# Patient Record
Sex: FEMALE | Race: OTHER | NOT HISPANIC OR LATINO | URBAN - METROPOLITAN AREA
[De-identification: names, ages, dates, MRNs, and addresses within clinical notes are randomized per-mention and may not be internally consistent; named-entity substitution may affect disease eponyms.]

---

## 2024-11-22 ENCOUNTER — EMERGENCY (EMERGENCY)
Facility: HOSPITAL | Age: 29
LOS: 1 days | Discharge: HOME | End: 2024-11-22
Attending: PERSONAL EMERGENCY RESPONSE ATTENDANT | Admitting: PERSONAL EMERGENCY RESPONSE ATTENDANT
Payer: COMMERCIAL

## 2024-11-22 VITALS
HEART RATE: 60 BPM | DIASTOLIC BLOOD PRESSURE: 82 MMHG | OXYGEN SATURATION: 100 % | SYSTOLIC BLOOD PRESSURE: 138 MMHG | RESPIRATION RATE: 16 BRPM | WEIGHT: 132.94 LBS | HEIGHT: 64 IN | TEMPERATURE: 98 F

## 2024-11-22 LAB
ALBUMIN SERPL ELPH-MCNC: 4.5 G/DL — SIGNIFICANT CHANGE UP (ref 3.3–5)
ALP SERPL-CCNC: 44 U/L — SIGNIFICANT CHANGE UP (ref 40–120)
ALT FLD-CCNC: 8 U/L — SIGNIFICANT CHANGE UP (ref 4–33)
ANION GAP SERPL CALC-SCNC: 12 MMOL/L — SIGNIFICANT CHANGE UP (ref 7–14)
APPEARANCE UR: ABNORMAL
AST SERPL-CCNC: 13 U/L — SIGNIFICANT CHANGE UP (ref 4–32)
BACTERIA # UR AUTO: ABNORMAL /HPF
BASOPHILS # BLD AUTO: 0.05 K/UL — SIGNIFICANT CHANGE UP (ref 0–0.2)
BASOPHILS NFR BLD AUTO: 0.3 % — SIGNIFICANT CHANGE UP (ref 0–2)
BILIRUB SERPL-MCNC: 0.4 MG/DL — SIGNIFICANT CHANGE UP (ref 0.2–1.2)
BILIRUB UR-MCNC: NEGATIVE — SIGNIFICANT CHANGE UP
BLD GP AB SCN SERPL QL: NEGATIVE — SIGNIFICANT CHANGE UP
BUN SERPL-MCNC: 11 MG/DL — SIGNIFICANT CHANGE UP (ref 7–23)
CALCIUM SERPL-MCNC: 9.6 MG/DL — SIGNIFICANT CHANGE UP (ref 8.4–10.5)
CAST: 0 /LPF — SIGNIFICANT CHANGE UP (ref 0–4)
CHLORIDE SERPL-SCNC: 102 MMOL/L — SIGNIFICANT CHANGE UP (ref 98–107)
CO2 SERPL-SCNC: 25 MMOL/L — SIGNIFICANT CHANGE UP (ref 22–31)
COLOR SPEC: YELLOW — SIGNIFICANT CHANGE UP
CREAT SERPL-MCNC: 0.72 MG/DL — SIGNIFICANT CHANGE UP (ref 0.5–1.3)
DIFF PNL FLD: ABNORMAL
EGFR: 117 ML/MIN/1.73M2 — SIGNIFICANT CHANGE UP
EOSINOPHIL # BLD AUTO: 0.11 K/UL — SIGNIFICANT CHANGE UP (ref 0–0.5)
EOSINOPHIL NFR BLD AUTO: 0.6 % — SIGNIFICANT CHANGE UP (ref 0–6)
GLUCOSE SERPL-MCNC: 97 MG/DL — SIGNIFICANT CHANGE UP (ref 70–99)
GLUCOSE UR QL: NEGATIVE MG/DL — SIGNIFICANT CHANGE UP
HCG SERPL-ACNC: SIGNIFICANT CHANGE UP MIU/ML
HCT VFR BLD CALC: 40.9 % — SIGNIFICANT CHANGE UP (ref 34.5–45)
HGB BLD-MCNC: 13.5 G/DL — SIGNIFICANT CHANGE UP (ref 11.5–15.5)
IANC: 11.02 K/UL — HIGH (ref 1.8–7.4)
IMM GRANULOCYTES NFR BLD AUTO: 0.8 % — SIGNIFICANT CHANGE UP (ref 0–0.9)
KETONES UR-MCNC: 15 MG/DL
LEUKOCYTE ESTERASE UR-ACNC: NEGATIVE — SIGNIFICANT CHANGE UP
LYMPHOCYTES # BLD AUTO: 25.7 % — SIGNIFICANT CHANGE UP (ref 13–44)
LYMPHOCYTES # BLD AUTO: 4.39 K/UL — HIGH (ref 1–3.3)
MCHC RBC-ENTMCNC: 30.2 PG — SIGNIFICANT CHANGE UP (ref 27–34)
MCHC RBC-ENTMCNC: 33 G/DL — SIGNIFICANT CHANGE UP (ref 32–36)
MCV RBC AUTO: 91.5 FL — SIGNIFICANT CHANGE UP (ref 80–100)
MONOCYTES # BLD AUTO: 1.38 K/UL — HIGH (ref 0–0.9)
MONOCYTES NFR BLD AUTO: 8.1 % — SIGNIFICANT CHANGE UP (ref 2–14)
NEUTROPHILS # BLD AUTO: 11.02 K/UL — HIGH (ref 1.8–7.4)
NEUTROPHILS NFR BLD AUTO: 64.5 % — SIGNIFICANT CHANGE UP (ref 43–77)
NITRITE UR-MCNC: NEGATIVE — SIGNIFICANT CHANGE UP
NRBC # BLD: 0 /100 WBCS — SIGNIFICANT CHANGE UP (ref 0–0)
NRBC # FLD: 0 K/UL — SIGNIFICANT CHANGE UP (ref 0–0)
PH UR: 6 — SIGNIFICANT CHANGE UP (ref 5–8)
PLATELET # BLD AUTO: 262 K/UL — SIGNIFICANT CHANGE UP (ref 150–400)
POTASSIUM SERPL-MCNC: 4.5 MMOL/L — SIGNIFICANT CHANGE UP (ref 3.5–5.3)
POTASSIUM SERPL-SCNC: 4.5 MMOL/L — SIGNIFICANT CHANGE UP (ref 3.5–5.3)
PROT SERPL-MCNC: 7.4 G/DL — SIGNIFICANT CHANGE UP (ref 6–8.3)
PROT UR-MCNC: NEGATIVE MG/DL — SIGNIFICANT CHANGE UP
RBC # BLD: 4.47 M/UL — SIGNIFICANT CHANGE UP (ref 3.8–5.2)
RBC # FLD: 12.6 % — SIGNIFICANT CHANGE UP (ref 10.3–14.5)
RBC CASTS # UR COMP ASSIST: 2 /HPF — SIGNIFICANT CHANGE UP (ref 0–4)
REVIEW: SIGNIFICANT CHANGE UP
RH IG SCN BLD-IMP: POSITIVE — SIGNIFICANT CHANGE UP
SODIUM SERPL-SCNC: 139 MMOL/L — SIGNIFICANT CHANGE UP (ref 135–145)
SP GR SPEC: 1.02 — SIGNIFICANT CHANGE UP (ref 1–1.03)
SQUAMOUS # UR AUTO: 17 /HPF — HIGH (ref 0–5)
UROBILINOGEN FLD QL: 0.2 MG/DL — SIGNIFICANT CHANGE UP (ref 0.2–1)
WBC # BLD: 17.09 K/UL — HIGH (ref 3.8–10.5)
WBC # FLD AUTO: 17.09 K/UL — HIGH (ref 3.8–10.5)
WBC UR QL: 2 /HPF — SIGNIFICANT CHANGE UP (ref 0–5)

## 2024-11-22 PROCEDURE — 99284 EMERGENCY DEPT VISIT MOD MDM: CPT

## 2024-11-22 PROCEDURE — 76817 TRANSVAGINAL US OBSTETRIC: CPT | Mod: 26

## 2024-11-22 RX ORDER — SODIUM CHLORIDE 9 MG/ML
1000 INJECTION, SOLUTION INTRAMUSCULAR; INTRAVENOUS; SUBCUTANEOUS ONCE
Refills: 0 | Status: COMPLETED | OUTPATIENT
Start: 2024-11-22 | End: 2024-11-22

## 2024-11-22 RX ADMIN — SODIUM CHLORIDE 1000 MILLILITER(S): 9 INJECTION, SOLUTION INTRAMUSCULAR; INTRAVENOUS; SUBCUTANEOUS at 21:50

## 2024-11-22 NOTE — ED PROVIDER NOTE - CLINICAL SUMMARY MEDICAL DECISION MAKING FREE TEXT BOX
Attending MD Rollins.  Pt seen and managed by me in real time.  No sig suprapubic TTP.  Pt transported to US and in US at time of signout to incoming team pending results and pelvic exam.  Pt well appearing and hemodynamically stable at time of signout.

## 2024-11-22 NOTE — ED PROVIDER NOTE - OBJECTIVE STATEMENT
Attending MD Rollins.  Pt is a 28 yo  at ~6 wks gestation without preg complication with established IUP on sono on Monday at .  Pt presents to ED currently with complaint of mild suprapubic cramping that has increased over course of day with vaginal spotting assoc.  No fevers/chills.  Pt endorses nausea during this pregnancy that has improved with outpt reglan.

## 2024-11-22 NOTE — ED PROVIDER NOTE - PROGRESS NOTE DETAILS
[employer]:  received sign out on patient.  pt feeling better.  US shows IUP and subchorionic hemorrhage.  will discharge with gyn follow up.

## 2024-11-22 NOTE — ED PROVIDER NOTE - PATIENT PORTAL LINK FT
You can access the FollowMyHealth Patient Portal offered by [facility] by registering at the following website: [email or web address]. By joining [employer]’s FollowMyHealth portal, you will also be able to view your health information using other applications (apps) compatible with our system.

## 2024-11-22 NOTE — ED ADULT TRIAGE NOTE - CHIEF COMPLAINT QUOTE
Pt reports she is 6 wks pregnant,  c/o pink vaginal spotting. Denies any associated pelvic cramping, heavy bleeding, nausea, vomiting or fevers. Denies any PMHx.

## 2024-11-22 NOTE — ED PROVIDER NOTE - NSFOLLOWUPINSTRUCTIONS_ED_ALL_ED_FT
Follow up with your OB/GYN in 3-5 days.    Return to the ED for increased bleeding, feeling faint or other emergent concerns.

## 2024-11-22 NOTE — ED ADULT NURSE NOTE - OBJECTIVE STATEMENT
Pt received in intake 12. Pt alert and oriented x 4, ambulatory at baseline. Pt denies pertinent medical history. Pt c/o pink vaginal spotting, pt , 6 weeks pregnant. Pt endorses abdominal cramping, states it feels like her period. Pt endorses nausea, reglan with some relief. Respirations even and unlabored, NAD. Pt denies chest pain, shortness of breath, N/V/D, headache, dizziness, fever, chills. 20G IV in the right AC, labs drawn and fluids infusing per MD orders.

## 2024-11-23 VITALS
TEMPERATURE: 98 F | DIASTOLIC BLOOD PRESSURE: 77 MMHG | HEART RATE: 89 BPM | SYSTOLIC BLOOD PRESSURE: 111 MMHG | OXYGEN SATURATION: 100 % | RESPIRATION RATE: 16 BRPM

## 2024-11-23 RX ORDER — METOCLOPRAMIDE HCL 10 MG
10 TABLET ORAL ONCE
Refills: 0 | Status: COMPLETED | OUTPATIENT
Start: 2024-11-23 | End: 2024-11-23

## 2024-11-23 RX ADMIN — Medication 10 MILLIGRAM(S): at 02:22

## 2024-11-23 NOTE — ED ADULT NURSE REASSESSMENT NOTE - NS ED NURSE REASSESS COMMENT FT1
Pt received from US at this time to RW. Pt A&Ox3 sitting up in stretcher resting comfortably. Respirations even and unlabored on room air. No acute distress noted. Denies headache, dizziness, chest pain, SOB, nausea or vomiting at this time. VS as noted in flow sheet. Plan of care ongoing, comfort measures provided and safety measures maintained. Awaiting US results.

## 2024-11-23 NOTE — ED ADULT NURSE REASSESSMENT NOTE - NS ED NURSE REASSESS COMMENT FT1
Upon reassessment pt A&Ox3 sitting up in stretcher endorsing relief of nausea at this time. Respirations even and unlabored on room air. No acute distress noted. Denies headache, dizziness, chest pain and SOB at this time. VS as noted in flow sheet. Pt discharged by MD Wasserman at bedside. Pt ambulated out of ED with steady gait.

## 2024-11-24 LAB
CULTURE RESULTS: SIGNIFICANT CHANGE UP
SPECIMEN SOURCE: SIGNIFICANT CHANGE UP

## 2024-11-25 ENCOUNTER — NON-APPOINTMENT (OUTPATIENT)
Age: 29
End: 2024-11-25

## 2024-11-25 ENCOUNTER — APPOINTMENT (OUTPATIENT)
Dept: OBGYN | Facility: CLINIC | Age: 29
End: 2024-11-25
Payer: COMMERCIAL

## 2024-11-25 ENCOUNTER — ASOB RESULT (OUTPATIENT)
Age: 29
End: 2024-11-25

## 2024-11-25 VITALS
WEIGHT: 132 LBS | SYSTOLIC BLOOD PRESSURE: 112 MMHG | OXYGEN SATURATION: 98 % | HEIGHT: 64 IN | TEMPERATURE: 98.1 F | HEART RATE: 57 BPM | DIASTOLIC BLOOD PRESSURE: 72 MMHG | BODY MASS INDEX: 22.53 KG/M2 | RESPIRATION RATE: 16 BRPM

## 2024-11-25 DIAGNOSIS — O20.0 THREATENED ABORTION: ICD-10-CM

## 2024-11-25 DIAGNOSIS — Z01.419 ENCOUNTER FOR GYNECOLOGICAL EXAMINATION (GENERAL) (ROUTINE) W/OUT ABNORMAL FINDINGS: ICD-10-CM

## 2024-11-25 PROCEDURE — 76817 TRANSVAGINAL US OBSTETRIC: CPT

## 2024-11-25 PROCEDURE — 99385 PREV VISIT NEW AGE 18-39: CPT

## 2024-11-27 LAB
C TRACH RRNA SPEC QL NAA+PROBE: NOT DETECTED
N GONORRHOEA RRNA SPEC QL NAA+PROBE: NOT DETECTED
SOURCE TP AMPLIFICATION: NORMAL

## 2024-12-02 LAB — CYTOLOGY CVX/VAG DOC THIN PREP: ABNORMAL

## 2024-12-18 ENCOUNTER — APPOINTMENT (OUTPATIENT)
Dept: OBGYN | Facility: CLINIC | Age: 29
End: 2024-12-18

## 2024-12-18 ENCOUNTER — APPOINTMENT (OUTPATIENT)
Dept: OBGYN | Facility: CLINIC | Age: 29
End: 2024-12-18
Payer: COMMERCIAL

## 2024-12-18 ENCOUNTER — EMERGENCY (EMERGENCY)
Facility: HOSPITAL | Age: 29
LOS: 1 days | Discharge: HOME | End: 2024-12-18
Admitting: STUDENT IN AN ORGANIZED HEALTH CARE EDUCATION/TRAINING PROGRAM
Payer: COMMERCIAL

## 2024-12-18 ENCOUNTER — ASOB RESULT (OUTPATIENT)
Age: 29
End: 2024-12-18

## 2024-12-18 VITALS
TEMPERATURE: 98 F | SYSTOLIC BLOOD PRESSURE: 121 MMHG | HEIGHT: 64 IN | RESPIRATION RATE: 18 BRPM | DIASTOLIC BLOOD PRESSURE: 89 MMHG | OXYGEN SATURATION: 100 % | HEART RATE: 66 BPM | WEIGHT: 130.07 LBS

## 2024-12-18 VITALS
TEMPERATURE: 98 F | DIASTOLIC BLOOD PRESSURE: 58 MMHG | SYSTOLIC BLOOD PRESSURE: 120 MMHG | RESPIRATION RATE: 18 BRPM | OXYGEN SATURATION: 100 % | HEART RATE: 57 BPM

## 2024-12-18 LAB
ALBUMIN SERPL ELPH-MCNC: 3.9 G/DL — SIGNIFICANT CHANGE UP (ref 3.3–5)
ALP SERPL-CCNC: 49 U/L — SIGNIFICANT CHANGE UP (ref 40–120)
ALT FLD-CCNC: 12 U/L — SIGNIFICANT CHANGE UP (ref 4–33)
ANION GAP SERPL CALC-SCNC: 12 MMOL/L — SIGNIFICANT CHANGE UP (ref 7–14)
APTT BLD: 27.4 SEC — SIGNIFICANT CHANGE UP (ref 24.5–35.6)
AST SERPL-CCNC: 18 U/L — SIGNIFICANT CHANGE UP (ref 4–32)
BASOPHILS # BLD AUTO: 0.05 K/UL — SIGNIFICANT CHANGE UP (ref 0–0.2)
BASOPHILS NFR BLD AUTO: 0.3 % — SIGNIFICANT CHANGE UP (ref 0–2)
BILIRUB SERPL-MCNC: <0.2 MG/DL — SIGNIFICANT CHANGE UP (ref 0.2–1.2)
BUN SERPL-MCNC: 14 MG/DL — SIGNIFICANT CHANGE UP (ref 7–23)
CALCIUM SERPL-MCNC: 9.8 MG/DL — SIGNIFICANT CHANGE UP (ref 8.4–10.5)
CHLORIDE SERPL-SCNC: 100 MMOL/L — SIGNIFICANT CHANGE UP (ref 98–107)
CO2 SERPL-SCNC: 24 MMOL/L — SIGNIFICANT CHANGE UP (ref 22–31)
CREAT SERPL-MCNC: 0.67 MG/DL — SIGNIFICANT CHANGE UP (ref 0.5–1.3)
EGFR: 123 ML/MIN/1.73M2 — SIGNIFICANT CHANGE UP
EGFR: 123 ML/MIN/1.73M2 — SIGNIFICANT CHANGE UP
EOSINOPHIL # BLD AUTO: 0.13 K/UL — SIGNIFICANT CHANGE UP (ref 0–0.5)
EOSINOPHIL NFR BLD AUTO: 0.9 % — SIGNIFICANT CHANGE UP (ref 0–6)
GLUCOSE SERPL-MCNC: 91 MG/DL — SIGNIFICANT CHANGE UP (ref 70–99)
HCG SERPL-ACNC: SIGNIFICANT CHANGE UP MIU/ML
HCT VFR BLD CALC: 38.3 % — SIGNIFICANT CHANGE UP (ref 34.5–45)
HGB BLD-MCNC: 13 G/DL — SIGNIFICANT CHANGE UP (ref 11.5–15.5)
IANC: 9.74 K/UL — HIGH (ref 1.8–7.4)
IMM GRANULOCYTES NFR BLD AUTO: 1.5 % — HIGH (ref 0–0.9)
INR BLD: 0.91 RATIO — SIGNIFICANT CHANGE UP (ref 0.85–1.16)
LYMPHOCYTES # BLD AUTO: 22.5 % — SIGNIFICANT CHANGE UP (ref 13–44)
LYMPHOCYTES # BLD AUTO: 3.35 K/UL — HIGH (ref 1–3.3)
MAGNESIUM SERPL-MCNC: 2 MG/DL — SIGNIFICANT CHANGE UP (ref 1.6–2.6)
MCHC RBC-ENTMCNC: 30.7 PG — SIGNIFICANT CHANGE UP (ref 27–34)
MCHC RBC-ENTMCNC: 33.9 G/DL — SIGNIFICANT CHANGE UP (ref 32–36)
MCV RBC AUTO: 90.3 FL — SIGNIFICANT CHANGE UP (ref 80–100)
MONOCYTES # BLD AUTO: 1.37 K/UL — HIGH (ref 0–0.9)
MONOCYTES NFR BLD AUTO: 9.2 % — SIGNIFICANT CHANGE UP (ref 2–14)
NEUTROPHILS # BLD AUTO: 9.74 K/UL — HIGH (ref 1.8–7.4)
NEUTROPHILS NFR BLD AUTO: 65.6 % — SIGNIFICANT CHANGE UP (ref 43–77)
NRBC # BLD AUTO: 0 K/UL — SIGNIFICANT CHANGE UP (ref 0–0)
NRBC # BLD: 0 /100 WBCS — SIGNIFICANT CHANGE UP (ref 0–0)
NRBC # FLD: 0 K/UL — SIGNIFICANT CHANGE UP (ref 0–0)
NRBC BLD-RTO: 0 /100 WBCS — SIGNIFICANT CHANGE UP (ref 0–0)
PHOSPHATE SERPL-MCNC: 4.2 MG/DL — SIGNIFICANT CHANGE UP (ref 2.5–4.5)
PLATELET # BLD AUTO: 253 K/UL — SIGNIFICANT CHANGE UP (ref 150–400)
POTASSIUM SERPL-MCNC: 4.4 MMOL/L — SIGNIFICANT CHANGE UP (ref 3.5–5.3)
POTASSIUM SERPL-SCNC: 4.4 MMOL/L — SIGNIFICANT CHANGE UP (ref 3.5–5.3)
PROT SERPL-MCNC: 6.9 G/DL — SIGNIFICANT CHANGE UP (ref 6–8.3)
PROTHROM AB SERPL-ACNC: 10.8 SEC — SIGNIFICANT CHANGE UP (ref 9.9–13.4)
RBC # BLD: 4.24 M/UL — SIGNIFICANT CHANGE UP (ref 3.8–5.2)
RBC # FLD: 12.4 % — SIGNIFICANT CHANGE UP (ref 10.3–14.5)
SODIUM SERPL-SCNC: 136 MMOL/L — SIGNIFICANT CHANGE UP (ref 135–145)
WBC # BLD: 14.86 K/UL — HIGH (ref 3.8–10.5)
WBC # FLD AUTO: 14.86 K/UL — HIGH (ref 3.8–10.5)

## 2024-12-18 PROCEDURE — 76817 TRANSVAGINAL US OBSTETRIC: CPT | Mod: 26

## 2024-12-18 PROCEDURE — 76817 TRANSVAGINAL US OBSTETRIC: CPT

## 2024-12-18 PROCEDURE — 99284 EMERGENCY DEPT VISIT MOD MDM: CPT

## 2024-12-18 PROCEDURE — 99213 OFFICE O/P EST LOW 20 MIN: CPT

## 2024-12-19 ENCOUNTER — APPOINTMENT (OUTPATIENT)
Dept: OBGYN | Facility: CLINIC | Age: 29
End: 2024-12-19
Payer: COMMERCIAL

## 2024-12-19 VITALS
RESPIRATION RATE: 16 BRPM | HEART RATE: 83 BPM | BODY MASS INDEX: 22.53 KG/M2 | OXYGEN SATURATION: 98 % | SYSTOLIC BLOOD PRESSURE: 124 MMHG | HEIGHT: 64 IN | WEIGHT: 132 LBS | DIASTOLIC BLOOD PRESSURE: 76 MMHG

## 2024-12-19 DIAGNOSIS — O02.1 MISSED ABORTION: ICD-10-CM

## 2024-12-19 PROCEDURE — 99214 OFFICE O/P EST MOD 30 MIN: CPT

## 2024-12-19 RX ORDER — MISOPROSTOL 200 UG/1
200 TABLET ORAL
Qty: 6 | Refills: 1 | Status: ACTIVE | COMMUNITY
Start: 2024-12-19 | End: 1900-01-01

## 2024-12-30 ENCOUNTER — APPOINTMENT (OUTPATIENT)
Dept: OBGYN | Facility: CLINIC | Age: 29
End: 2024-12-30

## 2025-03-07 ENCOUNTER — EMERGENCY (EMERGENCY)
Facility: HOSPITAL | Age: 30
LOS: 0 days | Discharge: HOME | End: 2025-03-07
Attending: EMERGENCY MEDICINE
Payer: COMMERCIAL

## 2025-03-07 VITALS
SYSTOLIC BLOOD PRESSURE: 121 MMHG | DIASTOLIC BLOOD PRESSURE: 74 MMHG | RESPIRATION RATE: 18 BRPM | HEIGHT: 64 IN | OXYGEN SATURATION: 97 % | TEMPERATURE: 98 F | WEIGHT: 123.9 LBS | HEART RATE: 65 BPM

## 2025-03-07 VITALS
OXYGEN SATURATION: 100 % | HEART RATE: 60 BPM | SYSTOLIC BLOOD PRESSURE: 110 MMHG | DIASTOLIC BLOOD PRESSURE: 62 MMHG | TEMPERATURE: 98 F | RESPIRATION RATE: 17 BRPM

## 2025-03-07 DIAGNOSIS — R51.9 HEADACHE, UNSPECIFIED: ICD-10-CM
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
## 2025-03-07 DIAGNOSIS — H91.90 UNSPECIFIED HEARING LOSS, UNSPECIFIED EAR: ICD-10-CM

## 2025-03-07 DIAGNOSIS — S06.0X0A CONCUSSION WITHOUT LOSS OF CONSCIOUSNESS, INITIAL ENCOUNTER: ICD-10-CM

## 2025-03-07 DIAGNOSIS — M25.512 PAIN IN LEFT SHOULDER: ICD-10-CM

## 2025-03-07 DIAGNOSIS — M62.838 OTHER MUSCLE SPASM: ICD-10-CM

## 2025-03-07 DIAGNOSIS — M54.2 CERVICALGIA: ICD-10-CM

## 2025-03-07 PROCEDURE — 99284 EMERGENCY DEPT VISIT MOD MDM: CPT

## 2025-03-07 PROCEDURE — 70450 CT HEAD/BRAIN W/O DYE: CPT | Mod: 26

## 2025-03-07 PROCEDURE — 99053 MED SERV 10PM-8AM 24 HR FAC: CPT

## 2025-03-07 PROCEDURE — 72125 CT NECK SPINE W/O DYE: CPT | Mod: 26

## 2025-03-07 RX ORDER — ACETAMINOPHEN 500 MG/5ML
976 LIQUID (ML) ORAL ONCE
Refills: 0 | Status: DISCONTINUED | OUTPATIENT
Start: 2025-03-07 | End: 2025-03-07

## 2025-03-07 RX ORDER — ACETAMINOPHEN 500 MG/5ML
975 LIQUID (ML) ORAL ONCE
Refills: 0 | Status: COMPLETED | OUTPATIENT
Start: 2025-03-07 | End: 2025-03-07

## 2025-03-07 RX ORDER — CYCLOBENZAPRINE HYDROCHLORIDE 15 MG/1
1 CAPSULE, EXTENDED RELEASE ORAL
Qty: 9 | Refills: 0
Start: 2025-03-07 | End: 2025-03-09

## 2025-03-07 RX ORDER — CYCLOBENZAPRINE HYDROCHLORIDE 15 MG/1
10 CAPSULE, EXTENDED RELEASE ORAL ONCE
Refills: 0 | Status: COMPLETED | OUTPATIENT
Start: 2025-03-07 | End: 2025-03-07

## 2025-03-07 RX ADMIN — CYCLOBENZAPRINE HYDROCHLORIDE 10 MILLIGRAM(S): 15 CAPSULE, EXTENDED RELEASE ORAL at 09:06

## 2025-03-07 RX ADMIN — Medication 975 MILLIGRAM(S): at 09:06

## 2025-03-07 NOTE — ED PROVIDER NOTE - MUSCULOSKELETAL, MLM
Spine appears normal, there is no t/l spinal tenderness but there is mid cervical tenderness. range of motion of neck limited due to pain, range of motion of extremities is not limited, there is tenderness and hypertonicity of left lateral trapezius muscle otherwise, no muscle or joint tenderness

## 2025-03-07 NOTE — ED ADULT NURSE NOTE - NSFALLUNIVINTERV_ED_ALL_ED
Bed/Stretcher in lowest position, wheels locked, appropriate side rails in place/Call bell, personal items and telephone in reach/Instruct patient to call for assistance before getting out of bed/chair/stretcher/Non-slip footwear applied when patient is off stretcher/[place] to call system/Physically safe environment - no spills, clutter or unnecessary equipment/Purposeful proactive rounding/Room/bathroom lighting operational, light cord in reach
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
## 2025-03-07 NOTE — ED PROVIDER NOTE - CRANIAL NERVE AND PUPILLARY EXAM
there is diminished hearing from the left ear, CN2-12 otherwise intact/extra-ocular movements intact/tongue is midline

## 2025-03-07 NOTE — ED PROVIDER NOTE - PATIENT PORTAL LINK FT
You can access the FollowMyHealth Patient Portal offered by [facility] by registering at the following website: [email or web address]. By joining [email or web address]’s FollowMyHealth portal, you will also be able to view your health information using other applications (apps) compatible with our system.

## 2025-03-07 NOTE — ED PROVIDER NOTE - ENMT, MLM
Airway patent, Nasal mucosa clear. Mouth with normal mucosa. Throat has no vesicles, no oropharyngeal exudates and uvula is midline. TM clear bilaterally.
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
## 2025-03-07 NOTE — ED PROVIDER NOTE - NSFOLLOWUPCLINICS_GEN_ALL_ED_FT
[facility]  Neurology  [address] [place] [address] [address]rd [place] [address]  Phone: [phone]  Fax:     [facility]  Neurology  [address] [place] [address]  Phone: [phone]  Fax: [phone]
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
## 2025-03-07 NOTE — ED PROVIDER NOTE - CROS ED NEURO NEG
no numbness, no weakness/no difficulty walking/imbalance/no seizures/no change in level of consciousness

## 2025-04-11 ENCOUNTER — EMERGENCY (EMERGENCY)
Facility: HOSPITAL | Age: 30
LOS: 1 days | End: 2025-04-11
Admitting: STUDENT IN AN ORGANIZED HEALTH CARE EDUCATION/TRAINING PROGRAM
Payer: COMMERCIAL

## 2025-04-11 VITALS
HEART RATE: 95 BPM | WEIGHT: 130.07 LBS | TEMPERATURE: 98 F | DIASTOLIC BLOOD PRESSURE: 86 MMHG | SYSTOLIC BLOOD PRESSURE: 136 MMHG | OXYGEN SATURATION: 99 % | HEIGHT: 64 IN | RESPIRATION RATE: 17 BRPM

## 2025-04-11 LAB
ALBUMIN SERPL ELPH-MCNC: 4.1 G/DL — SIGNIFICANT CHANGE UP (ref 3.3–5)
ALP SERPL-CCNC: 52 U/L — SIGNIFICANT CHANGE UP (ref 40–120)
ALT FLD-CCNC: 9 U/L — SIGNIFICANT CHANGE UP (ref 4–33)
ANION GAP SERPL CALC-SCNC: 10 MMOL/L — SIGNIFICANT CHANGE UP (ref 7–14)
APPEARANCE UR: CLEAR — SIGNIFICANT CHANGE UP
APTT BLD: 30.8 SEC — SIGNIFICANT CHANGE UP (ref 24.5–35.6)
AST SERPL-CCNC: 14 U/L — SIGNIFICANT CHANGE UP (ref 4–32)
BASOPHILS # BLD AUTO: 0.05 K/UL — SIGNIFICANT CHANGE UP (ref 0–0.2)
BASOPHILS NFR BLD AUTO: 0.4 % — SIGNIFICANT CHANGE UP (ref 0–2)
BILIRUB SERPL-MCNC: 0.3 MG/DL — SIGNIFICANT CHANGE UP (ref 0.2–1.2)
BILIRUB UR-MCNC: NEGATIVE — SIGNIFICANT CHANGE UP
BLD GP AB SCN SERPL QL: NEGATIVE — SIGNIFICANT CHANGE UP
BUN SERPL-MCNC: 12 MG/DL — SIGNIFICANT CHANGE UP (ref 7–23)
CALCIUM SERPL-MCNC: 9 MG/DL — SIGNIFICANT CHANGE UP (ref 8.4–10.5)
CHLORIDE SERPL-SCNC: 103 MMOL/L — SIGNIFICANT CHANGE UP (ref 98–107)
CO2 SERPL-SCNC: 25 MMOL/L — SIGNIFICANT CHANGE UP (ref 22–31)
COLOR SPEC: SIGNIFICANT CHANGE UP
CREAT SERPL-MCNC: 0.94 MG/DL — SIGNIFICANT CHANGE UP (ref 0.5–1.3)
DIFF PNL FLD: NEGATIVE — SIGNIFICANT CHANGE UP
EGFR: 85 ML/MIN/1.73M2 — SIGNIFICANT CHANGE UP
EGFR: 85 ML/MIN/1.73M2 — SIGNIFICANT CHANGE UP
EOSINOPHIL # BLD AUTO: 0.15 K/UL — SIGNIFICANT CHANGE UP (ref 0–0.5)
EOSINOPHIL NFR BLD AUTO: 1.3 % — SIGNIFICANT CHANGE UP (ref 0–6)
GLUCOSE SERPL-MCNC: 101 MG/DL — HIGH (ref 70–99)
GLUCOSE UR QL: NEGATIVE MG/DL — SIGNIFICANT CHANGE UP
HCG SERPL-ACNC: 3096 MIU/ML — SIGNIFICANT CHANGE UP
HCT VFR BLD CALC: 35.2 % — SIGNIFICANT CHANGE UP (ref 34.5–45)
HGB BLD-MCNC: 11.9 G/DL — SIGNIFICANT CHANGE UP (ref 11.5–15.5)
IANC: 6.08 K/UL — SIGNIFICANT CHANGE UP (ref 1.8–7.4)
IMM GRANULOCYTES NFR BLD AUTO: 0.8 % — SIGNIFICANT CHANGE UP (ref 0–0.9)
INR BLD: 1.02 RATIO — SIGNIFICANT CHANGE UP (ref 0.85–1.16)
KETONES UR-MCNC: 15 MG/DL
LEUKOCYTE ESTERASE UR-ACNC: NEGATIVE — SIGNIFICANT CHANGE UP
LYMPHOCYTES # BLD AUTO: 36.4 % — SIGNIFICANT CHANGE UP (ref 13–44)
LYMPHOCYTES # BLD AUTO: 4.37 K/UL — HIGH (ref 1–3.3)
MCHC RBC-ENTMCNC: 29.8 PG — SIGNIFICANT CHANGE UP (ref 27–34)
MCHC RBC-ENTMCNC: 33.8 G/DL — SIGNIFICANT CHANGE UP (ref 32–36)
MCV RBC AUTO: 88 FL — SIGNIFICANT CHANGE UP (ref 80–100)
MONOCYTES # BLD AUTO: 1.24 K/UL — HIGH (ref 0–0.9)
MONOCYTES NFR BLD AUTO: 10.3 % — SIGNIFICANT CHANGE UP (ref 2–14)
NEUTROPHILS # BLD AUTO: 6.08 K/UL — SIGNIFICANT CHANGE UP (ref 1.8–7.4)
NEUTROPHILS NFR BLD AUTO: 50.8 % — SIGNIFICANT CHANGE UP (ref 43–77)
NITRITE UR-MCNC: NEGATIVE — SIGNIFICANT CHANGE UP
NRBC # BLD AUTO: 0 K/UL — SIGNIFICANT CHANGE UP (ref 0–0)
NRBC # FLD: 0 K/UL — SIGNIFICANT CHANGE UP (ref 0–0)
NRBC BLD AUTO-RTO: 0 /100 WBCS — SIGNIFICANT CHANGE UP (ref 0–0)
PH UR: 6 — SIGNIFICANT CHANGE UP (ref 5–8)
PLATELET # BLD AUTO: 221 K/UL — SIGNIFICANT CHANGE UP (ref 150–400)
POTASSIUM SERPL-MCNC: 3.7 MMOL/L — SIGNIFICANT CHANGE UP (ref 3.5–5.3)
POTASSIUM SERPL-SCNC: 3.7 MMOL/L — SIGNIFICANT CHANGE UP (ref 3.5–5.3)
PROT SERPL-MCNC: 6.7 G/DL — SIGNIFICANT CHANGE UP (ref 6–8.3)
PROT UR-MCNC: SIGNIFICANT CHANGE UP MG/DL
PROTHROM AB SERPL-ACNC: 11.8 SEC — SIGNIFICANT CHANGE UP (ref 9.9–13.4)
RBC # BLD: 4 M/UL — SIGNIFICANT CHANGE UP (ref 3.8–5.2)
RBC # FLD: 13.7 % — SIGNIFICANT CHANGE UP (ref 10.3–14.5)
RH IG SCN BLD-IMP: POSITIVE — SIGNIFICANT CHANGE UP
SODIUM SERPL-SCNC: 138 MMOL/L — SIGNIFICANT CHANGE UP (ref 135–145)
SP GR SPEC: 1.03 — HIGH (ref 1–1.03)
UROBILINOGEN FLD QL: 1 MG/DL — SIGNIFICANT CHANGE UP (ref 0.2–1)
WBC # BLD: 11.99 K/UL — HIGH (ref 3.8–10.5)
WBC # FLD AUTO: 11.99 K/UL — HIGH (ref 3.8–10.5)

## 2025-04-11 PROCEDURE — 99284 EMERGENCY DEPT VISIT MOD MDM: CPT

## 2025-04-11 NOTE — ED ADULT NURSE NOTE - OBJECTIVE STATEMENT
A&Ox4. Denies past medical history. Presents to the ED with pelvic cramping and vaginal bleeding. States she mostly seeing spots upon movement. Unsure of LMP due to irregularity. Denies SOB, CP, or dizziness. Respirations even and unlabored. 22 gauge IV placed in left AC. Labs obtained. Awaiting diagnostic testing. Safety precautions in place.

## 2025-04-11 NOTE — ED PROVIDER NOTE - OBJECTIVE STATEMENT
Patient is a 29-year-old female G3, P0, currently pregnant via home pregnancy test endings to the ED with complaint of lower pelvic cramping and vaginal spotting.  Patient unsure of her exact LMP as her period has been irregular since the miscarriage occurred several months ago.  She is not currently bleeding at this present time.  There is no associated fever, chills, nausea, vomiting, urinary complaint.

## 2025-04-11 NOTE — ED PROVIDER NOTE - PATIENT PORTAL LINK FT
You can access the FollowMyHealth Patient Portal offered by [facility] by registering at the following website: [email or web address]. By joining [employer]’s FollowMyHealth portal, you will also be able to view your health information using other applications (apps) compatible with our system.

## 2025-04-11 NOTE — ED PROVIDER NOTE - CLINICAL SUMMARY MEDICAL DECISION MAKING FREE TEXT BOX
Patient is a 29-year-old female G3, P0, currently pregnant via home pregnancy test endings to the ED with complaint of lower pelvic cramping and vaginal spotting.   on presentation patient well-appearing, vital stable, not actively bleeding.  Plan for routine labs including transvaginal ultrasound.

## 2025-04-11 NOTE — ED ADULT TRIAGE NOTE - CHIEF COMPLAINT QUOTE
c/o intermittent vaginal bleeding and lower abdominal cramping since Monday. Endorses + home pregnancy test . . Denies SOB, chest pain, dizziness. nausea or vomiting. Well appearing. No Hx.

## 2025-04-11 NOTE — ED PROVIDER NOTE - NSFOLLOWUPINSTRUCTIONS_ED_ALL_ED_FT
please return to ED if she experiences any severe abdominal pain, dizziness, lightheadedness, severe n/v, or any heavy vaginal bleeding >2 pads/hour for >2 hours.      Follow up of b-hcg on day 4 and day 7 of methotrexate therapy.  4/15 and 4/18        Log Out.  [employer]® CareNotes®  :  [facility]      What is an ectopic pregnancy? Ectopic pregnancy occurs when a fertilized egg attaches and begins to grow outside of the uterus. The most common place for this to happen is in the fallopian tube. This is sometimes called a tubal pregnancy. The egg can also implant on the outside of the uterus, on the ovary or cervix, or in the abdomen. The egg may begin to grow, but the pregnancy cannot continue normally. Ectopic pregnancy can cause heavy bleeding and may be life-threatening.    What increases my risk for an ectopic pregnancy?    Pelvic inflammatory disease (PID) or infections, such as chlamydia    A past ectopic pregnancy, or past fertility problems    Fallopian tube injury or damage    Getting pregnant when you have an intrauterine device (IUD)    Past surgery in your abdomen or on your reproductive organs    Medicines to treat infertility or that contain female hormones    Certain fertility treatments, such as multiple embryo transplant    Smoking cigarettes    Age older than 35 years  What are the signs and symptoms of ectopic pregnancy?    One-sided abdominal or pelvic pain and cramping    Vaginal bleeding or spotting that happens about 7 weeks after your missed period    Nausea or vomiting    Dizziness, weakness, or fainting    Tissue coming out of your vagina  How is ectopic pregnancy diagnosed? Your healthcare provider will examine you and ask about other medical conditions or surgeries you have had. The provider will ask about pregnancies, miscarriages, infertility treatments, and sexually transmitted infections (STIs) you have had before. You may need any of the following:    A pelvic exam is used to check the size and shape of your uterus, cervix, and ovaries.    Blood and urine tests will show if you are currently pregnant, or if you have infections or other problems.    Ultrasound pictures may be taken of the inside of your uterus, ovaries, and abdomen. An ultrasound is usually done over your abdomen, but you may also need a vaginal ultrasound. During a vaginal ultrasound, a small tube is placed into your vagina. This can help healthcare providers see areas that may be hard to see during an abdominal ultrasound.  How is ectopic pregnancy treated? Your body may absorb the pregnancy tissues and your symptoms may decrease without any treatment. If this does not happen, you may need any of the following:    Medicine called methotrexate may be given to stop the pregnancy. This may be given as an injection. You may need more than one dose. It is important to follow up with your healthcare provider as directed if you receive this medicine.    Surgery may be done to repair or remove tissue or ruptured fallopian tubes. Your healthcare provider will talk to you about possible kinds of surgery. Your provider will consider where the ectopic pregnancy is located and the damage it caused. Talk to your provider about your desire to have children in the future. Some kinds of surgery will prevent future pregnancy.  Where can I get support and more information?    The [employer]  [address] [address]  [place] [address]  Phone: [phone]  Phone: [phone]  Web Address: [email or web address]  Call your local emergency number (911 in the [place]) if:    You have chest pain or trouble breathing.    Call your doctor if:    You have sharp pain in your lower abdomen that is severe and starts suddenly.    You feel lightheaded or like you are going to faint.    You have increasing abdominal or pelvic pain or heavy vaginal bleeding.    You have shoulder pain.    You have a fever.    You have questions or concerns about your condition or care.  CARE AGREEMENT:    You have the right to help plan your care. Learn about your health condition and how it may be treated. Discuss treatment options with your healthcare providers to decide what care you want to receive. You always have the right to refuse treatment.

## 2025-04-12 VITALS
TEMPERATURE: 98 F | HEART RATE: 63 BPM | OXYGEN SATURATION: 99 % | RESPIRATION RATE: 18 BRPM | DIASTOLIC BLOOD PRESSURE: 84 MMHG | SYSTOLIC BLOOD PRESSURE: 123 MMHG

## 2025-04-12 PROCEDURE — 76817 TRANSVAGINAL US OBSTETRIC: CPT | Mod: 26

## 2025-04-12 RX ORDER — METHOTREXATE 25 MG/ML
87.5 INJECTION, SOLUTION INTRA-ARTERIAL; INTRAMUSCULAR; INTRATHECAL; INTRAVENOUS ONCE
Refills: 0 | Status: COMPLETED | OUTPATIENT
Start: 2025-04-12 | End: 2025-04-12

## 2025-04-12 RX ADMIN — METHOTREXATE 87.5 MILLIGRAM(S): 25 INJECTION, SOLUTION INTRA-ARTERIAL; INTRAMUSCULAR; INTRATHECAL; INTRAVENOUS at 05:23

## 2025-04-12 NOTE — CONSULT NOTE ADULT - ASSESSMENT
29y  LMP ?late february presenting with vaginal spotting and lower abdominal cramping. Vitals wnl. Abdomen non tender, no bleeding on pad. CG 3096 (). TVUS: 1.4cm left adnexa lesion, no free fluid     Patient clinically and hemodynamically stable with no signs of rupture on imaging.    Patient's labs and imaging were reviewed and patient was counseled on options for treatment of ectopic pregnancy including methotrexate therapy vs. surgical intervention vs expectant management  Discussed with patient very low possibility of intrauterine pregnancy. Pt was given option of returning to monitor beta hcg, and repeat TVUS. Pt however desires methotrexate therapy.     -Patient counseled on methotrexate therapy as treatment for ectopic pregnancy.  Common side effects of the medication as well as restrictions while on the medication were reviewed with patient and patient signed methotrexate information sheet that was placed in her chart.    -Reviewed with patient the 15-20% methotrexate failure rate and possibility of necessity for additional dose of methotrexate.   -Consents for methotrexate signed with patient with attending at bedside.    -Chemotherapy drug order form sent to pharmacy with methotrexate dose calculated based on BSA for patient.   -Patient given strict precautions to call her physician or return to ED if she experiences any severe abdominal pain, dizziness, lightheadedness, severe n/v, or any heavy vaginal bleeding >2 pads/hour for >2 hours.    -Patient instructed on need for follow up of b-hcg on day 4 and day 7 of methotrexate therapy.  Patient intends to follow up in the ED due to work schedule on 4/15 and   -Once patient receives methotrexate therapy she is cleared for discharge from OBGYN perspective.  Primary managment per ED team.       Michelle Dale PGY2  Seen and evaluated with Dr. Crews

## 2025-04-15 ENCOUNTER — EMERGENCY (EMERGENCY)
Facility: HOSPITAL | Age: 30
LOS: 1 days | End: 2025-04-15
Admitting: STUDENT IN AN ORGANIZED HEALTH CARE EDUCATION/TRAINING PROGRAM
Payer: COMMERCIAL

## 2025-04-15 VITALS
TEMPERATURE: 97 F | WEIGHT: 145.06 LBS | DIASTOLIC BLOOD PRESSURE: 69 MMHG | SYSTOLIC BLOOD PRESSURE: 119 MMHG | RESPIRATION RATE: 17 BRPM | OXYGEN SATURATION: 100 % | HEART RATE: 98 BPM | HEIGHT: 64 IN

## 2025-04-15 LAB — HCG SERPL-ACNC: 4699 MIU/ML — SIGNIFICANT CHANGE UP

## 2025-04-15 PROCEDURE — 99283 EMERGENCY DEPT VISIT LOW MDM: CPT

## 2025-04-15 NOTE — ED ADULT TRIAGE NOTE - HEART RATE (BEATS/MIN)
"        Milton Beal   2017 4:00 PM   Office Visit   MRN: [id number]    Department:  [facility]   Dept Phone:  [phone]    Description:  Male : 1960   Provider:  Figueroa Lacey M.D.           Reason for Visit     Follow-Up           Allergies as of 2017     No Known Allergies      You were diagnosed with     Hyperglycemia   [971806]       Essential hypertension   [4068264]       Mixed hyperlipidemia   [272.2.ICD-9-CM]       Vitamin D deficiency disease   [489965]         Vital Signs     Blood Pressure Pulse Temperature Respirations Height Weight    138/64 mmHg 78 36.4 °C (97.5 °F) 14 1.702 m (5' 7\") 89.812 kg (198 lb)    Body Mass Index Oxygen Saturation Smoking Status             31.00 kg/m2 96% Never Smoker          Basic Information     Date Of Birth Sex Race Ethnicity Preferred Language    1960 Male White Non- English      Problem List              ICD-10-CM Priority Class Noted - Resolved    Hypertension I10   2015 - Present    Mixed hyperlipidemia E78.2   2015 - Present    Hyperglycemia R73.9   2015 - Present    Vitamin D deficiency disease E55.9   2015 - Present      Health Maintenance        Date Due Completion Dates    COLONOSCOPY 2023 (Done)    Override on 2013: Done ([facility]   normal)    IMM DTaP/Tdap/Td Vaccine (2 - Td) 2025            Current Immunizations     Tdap Vaccine 2015      Below and/or attached are the medications your provider expects you to take. Review all of your home medications and newly ordered medications with your provider and/or pharmacist. Follow medication instructions as directed by your provider and/or pharmacist. Please keep your medication list with you and share with your provider. Update the information when medications are discontinued, doses are changed, or new medications (including over-the-counter products) are added; and carry medication information at all times " in the event of emergency situations     Allergies:  No Known Allergies          Medications  Valid as of: June 27, 2017 -  5:22 PM    Generic Name Brand Name Tablet Size Instructions for use    Atorvastatin Calcium (Tab) LIPITOR 20 MG TAKE 1 TABLET BY MOUTH EVERY NIGHT AT BEDTIME        Cholecalciferol (Tab) vitamin D3 (cholecalciferol) 1000 UNIT Take 2 Tabs by mouth every day.        MetFORMIN HCl (Tab) GLUCOPHAGE 500 MG Take 1 Tab by mouth 2 times a day, with meals.        Sildenafil Citrate (Tab) VIAGRA 100 MG Take 1 Tab by mouth as needed for Erectile Dysfunction.        Valsartan (Tab) DIOVAN 320 MG Take 1 Tab by mouth every day.        .                 Medicines prescribed today were sent to:     [id number]  [place], [place] - [address] AT [facility]    [address] [address]    Phone: [phone] Fax: [phone]    Open 24 Hours?: No    [facility] - [address] [address]    Phone: [phone] Fax: [phone]    Open 24 Hours?: No      Medication refill instructions:       If your prescription bottle indicates you have medication refills left, it is not necessary to call your provider’s office. Please contact your pharmacy and they will refill your medication.    If your prescription bottle indicates you do not have any refills left, you may request refills at any time through one of the following ways: The online [employer] system (except Urgent Care), by calling your provider’s office, or by asking your pharmacy to contact your provider’s office with a refill request. Medication refills are processed only during regular business hours and may not be available until the next business day. Your provider may request additional information or to have a follow-up visit with you prior to refilling your medication.   *Please Note: Medication refills are assigned a new Rx number when  refilled electronically. Your pharmacy may indicate that no refills were authorized even though a new prescription for the same medication is available at the pharmacy. Please request the medicine by name with the pharmacy before contacting your provider for a refill.        Referral     A referral request has been sent to our patient care coordination department. Please allow 3-5 business days for us to process this request and contact you either by phone or mail. If you do not hear from us by the 5th business day, please call us at [phone].           [employer] Access Code: Activation code not generated  Current [employer] Status: Active           8 98

## 2025-04-15 NOTE — ED ADULT NURSE NOTE - OBJECTIVE STATEMENT
Pt received to intake room 10B  , A&Ox4, ambulatory, accompanied by family member coming to the ED for repeat hcg. Endorsing mild abdominal cramping no vaginal bleeding. Pt denies chest pain, sob, fevers, chills, N/V/D, dizziness, blurry vision, headache, urinary symptoms. RR equal and unlabored on room air. Abdomen soft, non-tender, non-distended. Neuro intact. labs drawn and sent. Care plan continued. Comfort measures provided. Safety maintained. Awaiting lab results and imaging.

## 2025-04-15 NOTE — CONSULT NOTE ADULT - SUBJECTIVE AND OBJECTIVE BOX
OMAR ARAUJO  29y  Female [id number]    HPI:  30yo  LMP unsure, ?late february presenting for day 4 bHCG s/p MTX for left sided ectopic.       Name of GYN Physician: Tomasz    POB:    Pgyn: Denies fibroids, cysts, endometriosis, STI's, Abnormal pap smears   PMHX:  PSHx:  Meds:  All:  Social History:  Denies smoking use, drug use, alcohol use.   +occasional social alcohol use    Vital Signs Last 24 Hrs  T(C): 36.3 (15 Apr 2025 17:25), Max: 36.3 (15 Apr 2025 17:25)  T(F): 97.4 (15 Apr 2025 17:25), Max: 97.4 (15 Apr 2025 17:25)  HR: 98 (15 Apr 2025 17:25) (98 - 98)  BP: 119/69 (15 Apr 2025 17:25) (119/69 - 119/69)  BP(mean): --  RR: 17 (15 Apr 2025 17:25) (17 - 17)  SpO2: 100% (15 Apr 2025 17:25) (100% - 100%)    Parameters below as of 15 Apr 2025 17:25  Patient On (Oxygen Delivery Method): room air        Physical Exam:   General: sitting comftorably in bed, NAD   HEENT: neck supple, full ROM  CV: RR S1S2 no m/r/g  Lungs: CTA b/l, good air flow b/l   Abd: Soft, non-tender, non-distended.        < from: US Transvaginal, OB (25 @ 02:01) >  PROCEDURE DATE:  2025          INTERPRETATION:  CLINICAL INFORMATION: Vaginal bleeding, hCG 3096    LMP: Unknown    COMPARISON: Ultrasound pelvis 2024    TECHNIQUE: Endovaginal pelvic sonogram only. Color and Spectral Doppler   was performed.    FINDINGS:  Uterus: Anteverted uterus measures 7.4 cm in length.    Endometrium: Thickened, measuring 23 mm. No intrauterine gestational sac   identified.    Right ovary: 5.0 cm x 1.9 cm x 2.1 cm. Corpus luteum measures 2.3 x 1.3 x   1.5 cm . Normal arterial and venous waveforms.  Left ovary: 1.6 cm x 1.9 cm x 2.3 cm. Within normal limits. Normal   arterial and venous waveforms.      Adnexa: A complex left adnexal lesion separate from the ovary measures   1.4 x 1.4 cm.    Fluid: None.    IMPRESSION:  Left tubal ectopic pregnancy.    Findings were discussed with DANIELA MARTE [phone] 2025 2:25 AM   by Dr. Mejias with read back confirmation.    --- End of Report ---      < end of copied text >     OMAR ARAUJO  29y  Female [id number]    HPI:  30yo  LMP unsure, ?late february presenting for day 4 bHCG s/p MTX for left sided ectopic. Pt has no complaints.       Name of GYN Physician: Dr. Tolbert    POB:  medical TOPx1, Missed ABx1 s/p medical management  Pgyn: Denies fibroids, cysts, endometriosis, STI's, Abnormal pap smears   PMHX: denies  PSHx: denies  Meds: denies  All: NKDA  Social History:  Denies smoking use, drug use, alcohol use.     Vital Signs Last 24 Hrs  T(C): 36.3 (15 Apr 2025 17:25), Max: 36.3 (15 Apr 2025 17:25)  T(F): 97.4 (15 Apr 2025 17:25), Max: 97.4 (15 Apr 2025 17:25)  HR: 98 (15 Apr 2025 17:25) (98 - 98)  BP: 119/69 (15 Apr 2025 17:25) (119/69 - 119/69)  BP(mean): --  RR: 17 (15 Apr 2025 17:25) (17 - 17)  SpO2: 100% (15 Apr 2025 17:25) (100% - 100%)    Parameters below as of 15 Apr 2025 17:25  Patient On (Oxygen Delivery Method): room air        Physical Exam:   General: sitting comfortably in bed, NAD   HEENT: neck supple, full ROM  CV: RR S1S2 no m/r/g  Lungs: CTA b/l, good air flow b/l   Abd: Soft, non-tender, non-distended.        < from: US Transvaginal, OB (25 @ 02:01) >  PROCEDURE DATE:  2025          INTERPRETATION:  CLINICAL INFORMATION: Vaginal bleeding, hCG 3096    LMP: Unknown    COMPARISON: Ultrasound pelvis 2024    TECHNIQUE: Endovaginal pelvic sonogram only. Color and Spectral Doppler   was performed.    FINDINGS:  Uterus: Anteverted uterus measures 7.4 cm in length.    Endometrium: Thickened, measuring 23 mm. No intrauterine gestational sac   identified.    Right ovary: 5.0 cm x 1.9 cm x 2.1 cm. Corpus luteum measures 2.3 x 1.3 x   1.5 cm . Normal arterial and venous waveforms.  Left ovary: 1.6 cm x 1.9 cm x 2.3 cm. Within normal limits. Normal   arterial and venous waveforms.      Adnexa: A complex left adnexal lesion separate from the ovary measures   1.4 x 1.4 cm.    Fluid: None.    IMPRESSION:  Left tubal ectopic pregnancy.    Findings were discussed with DANIELA MARTE [phone] 2025 2:25 AM   by Dr. Mejias with read back confirmation.    --- End of Report ---      < end of copied text >

## 2025-04-15 NOTE — ED PROVIDER NOTE - OBJECTIVE STATEMENT
29-year-old female G3, P0 presented to emergency room for hCG follow-up.  Patient was seen in emergency room on 4/11 for vaginal spotting and abdominal cramping had ultrasound at that time showing left adnexal lesion concerning for ectopic pregnancy.  Patient was given methotrexate at that time and recommended following back up in the emergency room today for repeat beta check.  Patient states on the 11th and 12th had significant abdominal cramping but states for the past 2 days the symptoms have improved.  Patient denies vaginal bleeding spotting fevers chills nausea vomiting weakness dizziness loss of consciousness.

## 2025-04-15 NOTE — ED ADULT NURSE NOTE - NSFALLUNIVINTERV_ED_ALL_ED
Bed/Stretcher in lowest position, wheels locked, appropriate side rails in place/Call bell, personal items and telephone in reach/Instruct patient to call for assistance before getting out of bed/chair/stretcher/Non-slip footwear applied when patient is off stretcher/[place] to call system/Physically safe environment - no spills, clutter or unnecessary equipment/Purposeful proactive rounding/Room/bathroom lighting operational, light cord in reach

## 2025-04-15 NOTE — ED PROVIDER NOTE - CLINICAL SUMMARY MEDICAL DECISION MAKING FREE TEXT BOX
29-year-old female G3, P0 status post methotrexate for possible ectopic on 4/12 now presented to the emergency room for repeat beta-hCG  Patient well-appearing vital signs stable  Serum beta-hCG  OB/GYN consult

## 2025-04-15 NOTE — ED ADULT TRIAGE NOTE - CHIEF COMPLAINT QUOTE
pt sent in for follow-up to have beta HCG levels drawn. pt seen here in ED last Friday, had ectopic pregnancy and received methotrexate. endorses mild pelvic cramping.   denies medical history

## 2025-04-15 NOTE — ED PROVIDER NOTE - PROGRESS NOTE DETAILS
DANIELA Torres: patient was seen by OBGYN - recommending return to ER on 5/18 for rpt hcg. Writer went to reassess patient and go over dc instructions - patient left prior to receiving discharge instructions.

## 2025-04-15 NOTE — CONSULT NOTE ADULT - ASSESSMENT
28yo  LMP unsure, ?late february presenting for day 4 bHCG s/p MTX for left sided tubal ectopic. Vitals signs stable.   bHCG 3096 () -> 4699 (4/15). Pt clinically stable      Recs:     - f/u day 7 bHCG on   -Patient given strict precautions to call her physician or return to ED if she experiences any severe abdominal pain, dizziness, lightheadedness, severe n/v, or any heavy vaginal bleeding >2 pads/hour for >2 hours.     Michelle Dale PGY2  D/w Dr. Olivas  30yo  LMP unsure, ?late february presenting for day 4 bHCG s/p MTX for left sided tubal ectopic. Vitals signs stable.   bHCG 3096 () ->MTX-> 4699 (day 4; 4/15). Pt clinically stable      Recs:   - f/u day 7 bHCG on  in office or ED  -Patient given strict precautions to call her physician or return to ED if she experiences any severe abdominal pain, dizziness, lightheadedness, severe n/v, or any heavy vaginal bleeding >2 pads/hour for >2 hours.     Michelle Dale PGY2  D/w Dr. Olivas  28yo  LMP unsure, ?late february presenting for day 4 bHCG s/p MTX for left sided tubal ectopic. Vitals signs stable.   bHCG 3096 () ->MTX-> 4699 (day 4; 4/15). Pt clinically stable      Recs:   - f/u day 7 bHCG on  in office or ED  -Patient given strict precautions to call her physician or return to ED if she experiences any severe abdominal pain, dizziness, lightheadedness, severe n/v, or any heavy vaginal bleeding >2 pads/hour for >2 hours.     Michelle Dale PGY2  D/w Dr. Olivas       Agree with above note  Dr. Tolbert made aware     MD Brendon

## 2025-04-15 NOTE — ED PROVIDER NOTE - PATIENT PORTAL LINK FT
You can access the FollowMyHealth Patient Portal offered by [facility] by registering at the following website: [email or web address]. By joining [employer]’s FollowMyHealth portal, you will also be able to view your health information using other applications (apps) compatible with our system.

## 2025-04-18 ENCOUNTER — EMERGENCY (EMERGENCY)
Facility: HOSPITAL | Age: 30
LOS: 1 days | End: 2025-04-18
Admitting: STUDENT IN AN ORGANIZED HEALTH CARE EDUCATION/TRAINING PROGRAM
Payer: COMMERCIAL

## 2025-04-18 VITALS
RESPIRATION RATE: 14 BRPM | HEIGHT: 64 IN | SYSTOLIC BLOOD PRESSURE: 114 MMHG | HEART RATE: 69 BPM | TEMPERATURE: 98 F | DIASTOLIC BLOOD PRESSURE: 74 MMHG | WEIGHT: 145.06 LBS | OXYGEN SATURATION: 99 %

## 2025-04-18 LAB — HCG SERPL-ACNC: 3860 MIU/ML — SIGNIFICANT CHANGE UP

## 2025-04-18 PROCEDURE — 99284 EMERGENCY DEPT VISIT MOD MDM: CPT

## 2025-04-18 NOTE — ED ADULT NURSE NOTE - OBJECTIVE STATEMENT
A&OX4, ambulatory, Patient is coming to ED in regards to repeat HCG test, states she received methotrexate due to ectopic  pregnancy on 4/12/25. labs drawn and sent will continue to monitor.

## 2025-04-18 NOTE — ED PROVIDER NOTE - OBJECTIVE STATEMENT
29-year-old female G3, P0 presenting to the ER for repeat hCG follow-up day 7.  Patient was seen in the ER on 411, found with a left tubal ectopic pregnancy.  Patient given methotrexate.  Day 4 of methotrexate 4699.  Denies pain, bleeding, lightheadedness, dizziness, urinary symptoms, chest pain, shortness of breath, nausea, vomiting, diarrhea.

## 2025-04-18 NOTE — ED PROVIDER NOTE - PATIENT PORTAL LINK FT
You can access the FollowMyHealth Patient Portal offered by [facility] by registering at the following website: [email or web address]. By joining [employer]’s FollowMyHealth portal, you will also be able to view your health information using other applications (apps) compatible with our system.

## 2025-04-18 NOTE — CONSULT NOTE ADULT - ASSESSMENT
28yo  LMP unsure, ?late february presenting for day 4 bHCG s/p MTX for left sided tubal ectopic. Vitals stable    bHCG 3096 () ->MTX-> 4699 (day 4; 4/15) -> 3860 (day 7; ). Pt clinically stable  Pt with >15% decrease in bHCG from day 4. She has no GYN complaints    Recs:   - f/u 1 week rpt bHCG on  in office  -Patient given strict precautions to call her physician or return to ED if she experiences any severe abdominal pain, dizziness, lightheadedness, severe n/v, or any heavy vaginal bleeding >2 pads/hour for >2 hours.     Michelle Dale PGY2  D/w Dr. Olivas

## 2025-04-18 NOTE — ED PROVIDER NOTE - PROGRESS NOTE DETAILS
Beta-hCG 3860, seen by OB/GYN, patient meeting this 60% decrease, cleared for discharge with follow-up in 1 week with her OB/GYN.  Patient advised on strict return precautions by myself as well as OB/GYN.  Will advise patient if she cannot follow-up with her OB/GYN to return to the ER for repeat.

## 2025-04-18 NOTE — CONSULT NOTE ADULT - SUBJECTIVE AND OBJECTIVE BOX
OMAR ARAUJO  29y  Female [id number]    HPI:  28yo  LMP unsure, ?late february presenting for day 4 bHCG s/p MTX for left sided tubal ectopic. Pt has no complaints, reports feeling better today. Denies abdominal pain or vaginal bleeding       Name of GYN Physician: Dr. Tolbert    POB:  medical TOPx1, Missed ABx1 s/p medical management  Pgyn: Denies fibroids, cysts, endometriosis, STI's, Abnormal pap smears   PMHX: denies  PSHx: denies  Meds: denies  All: NKDA  Social History:  Denies smoking use, drug use, alcohol use.     Vital Signs Last 24 Hrs  T(C): 36.7 (2025 19:12), Max: 36.7 (2025 19:12)  T(F): 98 (2025 19:12), Max: 98 (2025 19:12)  HR: 69 (2025 19:12) (69 - 69)  BP: 114/74 (2025 19:12) (114/74 - 114/74)  BP(mean): --  RR: 14 (2025 19:12) (14 - 14)  SpO2: 99% (2025 19:12) (99% - 99%)    Parameters below as of 2025 19:12  Patient On (Oxygen Delivery Method): room air        Physical Exam:   General: sitting comftorably in bed, NAD   HEENT: neck supple, full ROM  CV: RR S1S2 no m/r/g  Lungs: CTA b/l, good air flow b/l   Abd: Soft, non-tender, non-distended.

## 2025-04-18 NOTE — ED PROVIDER NOTE - CLINICAL SUMMARY MEDICAL DECISION MAKING FREE TEXT BOX
29-year-old female G3, P0 presenting to the ER for repeat hCG follow-up day 7.  Patient was seen in the ER on 411, found with a left tubal ectopic pregnancy.  Patient given methotrexate.  Day 4 of methotrexate 4699.  Denies pain, bleeding, lightheadedness, dizziness, urinary symptoms, chest pain, shortness of breath, nausea, vomiting, diarrhea..  Patient is well-appearing, vitally stable without significant tenderness on examination.  Plan for repeat hCG and OB/GYN consult.

## 2025-04-18 NOTE — ED ADULT TRIAGE NOTE - CHIEF COMPLAINT QUOTE
pt ambulatory needs repeat HCG levels, received methotrexate for ectopic pregnancy on 4/12. denies pain, vaginal bleeding.

## 2025-04-18 NOTE — ED PROVIDER NOTE - NS_EDPROVIDERDISPOUSERTYPE_ED_A_ED
----- Message from [address] sent at 12/16/2022  9:19 AM EST -----  Scheduled pt for RHC with Dr Atif Kapadia on 1/6/2023 at [facility] with an arrival time of 10am. Pt would like the labs drawn at the [facility] in [place]. Pts daughter would like them mailed to her:  [address]  ----- Message -----  From: Robson Aguilar  Sent: 12/16/2022   8:41 AM EST  To: [address] [facility]    For the [facility] patient can stop spirolactone and lasix the day of the procedure.   Thanks I have personally evaluated and examined the patient. The Attending was available to me as a supervising provider if needed.

## 2025-04-18 NOTE — ED PROVIDER NOTE - NSFOLLOWUPINSTRUCTIONS_ED_ALL_ED_FT
Follow up with your OBGYN    IF any worsening pain, new bleeding, lightheadedness, dizziness, nausea, vomiting, or any other new or concerning symptoms return to the ER. Follow up with your OBGYN in one week Friday, 4/25/2025 for repeat HCG, if unable to follow with OBGYN please return to the ER.     IF any worsening pain, new, heavy vaginal  bleeding, lightheadedness, dizziness, nausea, vomiting, or any other new or concerning symptoms return to the ER.

## 2025-04-21 ENCOUNTER — EMERGENCY (EMERGENCY)
Facility: HOSPITAL | Age: 30
LOS: 1 days | End: 2025-04-21
Attending: EMERGENCY MEDICINE | Admitting: STUDENT IN AN ORGANIZED HEALTH CARE EDUCATION/TRAINING PROGRAM
Payer: COMMERCIAL

## 2025-04-21 VITALS
RESPIRATION RATE: 18 BRPM | OXYGEN SATURATION: 98 % | SYSTOLIC BLOOD PRESSURE: 128 MMHG | TEMPERATURE: 98 F | HEART RATE: 85 BPM | HEIGHT: 64 IN | WEIGHT: 139.99 LBS | DIASTOLIC BLOOD PRESSURE: 62 MMHG

## 2025-04-21 VITALS
SYSTOLIC BLOOD PRESSURE: 109 MMHG | RESPIRATION RATE: 16 BRPM | HEART RATE: 64 BPM | OXYGEN SATURATION: 100 % | DIASTOLIC BLOOD PRESSURE: 64 MMHG

## 2025-04-21 LAB
ALBUMIN SERPL ELPH-MCNC: 4.3 G/DL — SIGNIFICANT CHANGE UP (ref 3.3–5)
ALP SERPL-CCNC: 58 U/L — SIGNIFICANT CHANGE UP (ref 40–120)
ALT FLD-CCNC: 8 U/L — SIGNIFICANT CHANGE UP (ref 4–33)
ANION GAP SERPL CALC-SCNC: 12 MMOL/L — SIGNIFICANT CHANGE UP (ref 7–14)
APTT BLD: 25.7 SEC — SIGNIFICANT CHANGE UP (ref 24.5–35.6)
AST SERPL-CCNC: 19 U/L — SIGNIFICANT CHANGE UP (ref 4–32)
BASOPHILS # BLD AUTO: 0.07 K/UL — SIGNIFICANT CHANGE UP (ref 0–0.2)
BASOPHILS NFR BLD AUTO: 0.4 % — SIGNIFICANT CHANGE UP (ref 0–2)
BILIRUB SERPL-MCNC: <0.2 MG/DL — SIGNIFICANT CHANGE UP (ref 0.2–1.2)
BLD GP AB SCN SERPL QL: NEGATIVE — SIGNIFICANT CHANGE UP
BUN SERPL-MCNC: 11 MG/DL — SIGNIFICANT CHANGE UP (ref 7–23)
CALCIUM SERPL-MCNC: 9 MG/DL — SIGNIFICANT CHANGE UP (ref 8.4–10.5)
CHLORIDE SERPL-SCNC: 100 MMOL/L — SIGNIFICANT CHANGE UP (ref 98–107)
CO2 SERPL-SCNC: 25 MMOL/L — SIGNIFICANT CHANGE UP (ref 22–31)
CREAT SERPL-MCNC: 0.76 MG/DL — SIGNIFICANT CHANGE UP (ref 0.5–1.3)
EGFR: 110 ML/MIN/1.73M2 — SIGNIFICANT CHANGE UP
EGFR: 110 ML/MIN/1.73M2 — SIGNIFICANT CHANGE UP
EOSINOPHIL # BLD AUTO: 0.14 K/UL — SIGNIFICANT CHANGE UP (ref 0–0.5)
EOSINOPHIL NFR BLD AUTO: 0.9 % — SIGNIFICANT CHANGE UP (ref 0–6)
GLUCOSE SERPL-MCNC: 99 MG/DL — SIGNIFICANT CHANGE UP (ref 70–99)
HCG SERPL-ACNC: 4361 MIU/ML — SIGNIFICANT CHANGE UP
HCT VFR BLD CALC: 42.7 % — SIGNIFICANT CHANGE UP (ref 34.5–45)
HGB BLD-MCNC: 14 G/DL — SIGNIFICANT CHANGE UP (ref 11.5–15.5)
IANC: 11.05 K/UL — HIGH (ref 1.8–7.4)
IMM GRANULOCYTES NFR BLD AUTO: 0.7 % — SIGNIFICANT CHANGE UP (ref 0–0.9)
INR BLD: <0.9 RATIO — SIGNIFICANT CHANGE UP (ref 0.85–1.16)
LYMPHOCYTES # BLD AUTO: 23.8 % — SIGNIFICANT CHANGE UP (ref 13–44)
LYMPHOCYTES # BLD AUTO: 3.88 K/UL — HIGH (ref 1–3.3)
MCHC RBC-ENTMCNC: 29.5 PG — SIGNIFICANT CHANGE UP (ref 27–34)
MCHC RBC-ENTMCNC: 32.8 G/DL — SIGNIFICANT CHANGE UP (ref 32–36)
MCV RBC AUTO: 89.9 FL — SIGNIFICANT CHANGE UP (ref 80–100)
MONOCYTES # BLD AUTO: 1.03 K/UL — HIGH (ref 0–0.9)
MONOCYTES NFR BLD AUTO: 6.3 % — SIGNIFICANT CHANGE UP (ref 2–14)
NEUTROPHILS # BLD AUTO: 11.05 K/UL — HIGH (ref 1.8–7.4)
NEUTROPHILS NFR BLD AUTO: 67.9 % — SIGNIFICANT CHANGE UP (ref 43–77)
NRBC # BLD AUTO: 0 K/UL — SIGNIFICANT CHANGE UP (ref 0–0)
NRBC # FLD: 0 K/UL — SIGNIFICANT CHANGE UP (ref 0–0)
NRBC BLD AUTO-RTO: 0 /100 WBCS — SIGNIFICANT CHANGE UP (ref 0–0)
PLATELET # BLD AUTO: 270 K/UL — SIGNIFICANT CHANGE UP (ref 150–400)
POTASSIUM SERPL-MCNC: 4.3 MMOL/L — SIGNIFICANT CHANGE UP (ref 3.5–5.3)
POTASSIUM SERPL-SCNC: 4.3 MMOL/L — SIGNIFICANT CHANGE UP (ref 3.5–5.3)
PROT SERPL-MCNC: 7.1 G/DL — SIGNIFICANT CHANGE UP (ref 6–8.3)
PROTHROM AB SERPL-ACNC: 10.2 SEC — SIGNIFICANT CHANGE UP (ref 9.9–13.4)
RBC # BLD: 4.75 M/UL — SIGNIFICANT CHANGE UP (ref 3.8–5.2)
RBC # FLD: 13.9 % — SIGNIFICANT CHANGE UP (ref 10.3–14.5)
RH IG SCN BLD-IMP: POSITIVE — SIGNIFICANT CHANGE UP
SODIUM SERPL-SCNC: 137 MMOL/L — SIGNIFICANT CHANGE UP (ref 135–145)
WBC # BLD: 16.29 K/UL — HIGH (ref 3.8–10.5)
WBC # FLD AUTO: 16.29 K/UL — HIGH (ref 3.8–10.5)

## 2025-04-21 PROCEDURE — 76705 ECHO EXAM OF ABDOMEN: CPT | Mod: 26

## 2025-04-21 PROCEDURE — 76817 TRANSVAGINAL US OBSTETRIC: CPT | Mod: 26

## 2025-04-21 PROCEDURE — 99285 EMERGENCY DEPT VISIT HI MDM: CPT

## 2025-04-21 PROCEDURE — 76830 TRANSVAGINAL US NON-OB: CPT | Mod: 26

## 2025-04-21 RX ORDER — ACETAMINOPHEN 500 MG/5ML
1000 LIQUID (ML) ORAL ONCE
Refills: 0 | Status: COMPLETED | OUTPATIENT
Start: 2025-04-21 | End: 2025-04-21

## 2025-04-21 RX ORDER — METHOTREXATE 25 MG/ML
87.5 INJECTION, SOLUTION INTRA-ARTERIAL; INTRAMUSCULAR; INTRATHECAL; INTRAVENOUS ONCE
Refills: 0 | Status: COMPLETED | OUTPATIENT
Start: 2025-04-21 | End: 2025-04-21

## 2025-04-21 RX ADMIN — Medication 400 MILLIGRAM(S): at 14:11

## 2025-04-21 RX ADMIN — METHOTREXATE 87.5 MILLIGRAM(S): 25 INJECTION, SOLUTION INTRA-ARTERIAL; INTRAMUSCULAR; INTRATHECAL; INTRAVENOUS at 19:13

## 2025-04-21 RX ADMIN — Medication 1000 MILLILITER(S): at 14:11

## 2025-04-21 NOTE — ED ADULT NURSE NOTE - NSFALLUNIVINTERV_ED_ALL_ED
Bed/Stretcher in lowest position, wheels locked, appropriate side rails in place/Call bell, personal items and telephone in reach/Instruct patient to call for assistance before getting out of bed/chair/stretcher/Non-slip footwear applied when patient is off stretcher/[place] to call system/Physically safe environment - no spills, clutter or unnecessary equipment/Purposeful proactive rounding/Room/bathroom lighting operational, light cord in reach

## 2025-04-21 NOTE — ED ADULT NURSE NOTE - OBJECTIVE STATEMENT
pt received to room intake #1 with c/o vaginal bleeding and abd pain. pt is s/p methotrexate shot 2/2 ectopic pregnancy. pt aox4. IV placed, labs drawn and sent. medication given as per MDs orders. U/S being preformed by MD at beside.

## 2025-04-21 NOTE — ED ADULT TRIAGE NOTE - CHIEF COMPLAINT QUOTE
pt recently treated for ectopic pregnancy, received methotrexate on 4/11th c/o of severe lt lower abd pain and vag bleed since this am,

## 2025-04-21 NOTE — ED PROVIDER NOTE - CLINICAL SUMMARY MEDICAL DECISION MAKING FREE TEXT BOX
29-year-old female 10 days post methotrexate for left-sided tubal ectopic pregnancy now with concerns for possible ruptured ectopic with failure of methotrexate.  Will give IV fluids and check beta and ultrasound to look for free fluid and rupture.  Will also call GYN.  Will give pain medication as needed and IV fluids.

## 2025-04-21 NOTE — CONSULT NOTE ADULT - SUBJECTIVE AND OBJECTIVE BOX
OMAR ARAUJO  29y  Female [id number]    HPI:   28yo  (LMP unsure) presents with abdominal pain and vaginal bleeding after MTX for L sided ectopic. Pt for day 4 bHCG s/p MTX for left sided tubal ectopic. Pt has no complaints, reports feeling better today. Denies abdominal pain or vaginal bleeding     last NPO@12p.   Name of GYN Physician: Dr. Tolbert    POB: medical TOPx1, Missed ABx1 s/p medical management  Pgyn: Denies fibroids, cysts, endometriosis, STI's, Abnormal pap smears   PMHX: denies  PSHx: denies  Meds: denies  All: NKDA  Social History:  Denies smoking use, drug use, alcohol use.       Vital Signs Last 24 Hrs  T(C): 36.7 (2025 13:25), Max: 36.7 (2025 13:25)  T(F): 98 (2025 13:25), Max: 98 (2025 13:25)  HR: 85 (2025 13:25) (85 - 85)  BP: 128/62 (2025 13:25) (128/62 - 128/62)  BP(mean): --  RR: 18 (2025 13:25) (18 - 18)  SpO2: 98% (2025 13:25) (98% - 98%)    Parameters below as of 2025 13:25  Patient On (Oxygen Delivery Method): room air        Physical Exam:   General: sitting comfortably in bed, NAD   Lungs: Breathing comfortably on room air   Abd: Soft, mildly-tender, non-distended.  Bowel sounds present.    :  No bleeding on pad.    External labia wnl.  Bimanual exam with no cervical motion tenderness, uterus wnl, adnexa non palpable b/l.    Speculum Exam: No active bleeding from os.  Physiologic discharge.    Ext: non-tender b/l, no edema     LABS:                            14.0   16.29 )-----------( 270      ( 2025 14:00 )             42.7     04-    137  |  100  |  11  ----------------------------<  99  4.3   |  25  |  0.76    Ca    9.0      2025 14:00    TPro  7.1  /  Alb  4.3  /  TBili  <0.2  /  DBili  x   /  AST  19  /  ALT  8   /  AlkPhos  58  04-21    I&O's Detail    PT/INR - ( 2025 14:00 )   PT: 10.2 sec;   INR: <0.90 ratio         PTT - ( 2025 14:00 )  PTT:25.7 sec  Urinalysis Basic - ( 2025 14:00 )    Color: x / Appearance: x / SG: x / pH: x  Gluc: 99 mg/dL / Ketone: x  / Bili: x / Urobili: x   Blood: x / Protein: x / Nitrite: x   Leuk Esterase: x / RBC: x / WBC x   Sq Epi: x / Non Sq Epi: x / Bacteria: x        RADIOLOGY & ADDITIONAL STUDIES:  TVUS:  OMAR ARAUJO  29y  Female [id number]    HPI:   30yo  (LMP unsure) presents with abdominal pain and vaginal bleeding s/p MTX () for L sided ectopic. Pt reports sudden lower abdominal cramping this morning that improved since arriving to ED, patient received Tylenol at 2PM. Pt also reports vaginal bleeding starting this morning saturating 2 small pads today. Denies any n/v, lightheadedness, dizziness, fevers, chills.  Day 7 bHCG downtrended 19% from day 4 bHCG, however, bHCG today increased from 3860 to 4361.     last NPO@12p.     bHC()->4699(4/15,d4)->3860(,d7)->4361()     Name of GYN Physician: Dr. Tolbert    POB: medical TOPx1, Missed ABx1 s/p medical management  Pgyn: Denies fibroids, cysts, endometriosis, STI's, Abnormal pap smears   PMHX: denies  PSHx: denies  Meds: denies  All: NKDA  Social History:  Denies smoking use, drug use, alcohol use.       Vital Signs Last 24 Hrs  T(C): 36.7 (2025 13:25), Max: 36.7 (2025 13:25)  T(F): 98 (2025 13:25), Max: 98 (2025 13:25)  HR: 85 (2025 13:25) (85 - 85)  BP: 128/62 (2025 13:25) (128/62 - 128/62)  BP(mean): --  RR: 18 (2025 13:25) (18 - 18)  SpO2: 98% (2025 13:25) (98% - 98%)    Parameters below as of 2025 13:25  Patient On (Oxygen Delivery Method): room air        Physical Exam:   General: sitting comfortably cross-legged in bed, NAD   Lungs: Breathing comfortably on room air   Abd: Soft, mildly-tender over suprapubic region, non-distended.  No rebound/guarding.   :  Spotting on pad.    External labia wnl.  Bimanual exam with no cervical motion tenderness, uterus wnl, adnexa non palpable b/l.    Speculum Exam: No active bleeding from os.  Physiologic discharge.    Ext: non-tender b/l, no edema     LABS:                            14.0   16.29 )-----------( 270      ( 2025 14:00 )             42.7     -    137  |  100  |  11  ----------------------------<  99  4.3   |  25  |  0.76    Ca    9.0      2025 14:00    TPro  7.1  /  Alb  4.3  /  TBili  <0.2  /  DBili  x   /  AST  19  /  ALT  8   /  AlkPhos  58      I&O's Detail    PT/INR - ( 2025 14:00 )   PT: 10.2 sec;   INR: <0.90 ratio         PTT - ( 2025 14:00 )  PTT:25.7 sec  Urinalysis Basic - ( 2025 14:00 )    Color: x / Appearance: x / SG: x / pH: x  Gluc: 99 mg/dL / Ketone: x  / Bili: x / Urobili: x   Blood: x / Protein: x / Nitrite: x   Leuk Esterase: x / RBC: x / WBC x   Sq Epi: x / Non Sq Epi: x / Bacteria: x        RADIOLOGY & ADDITIONAL STUDIES:  TVUS:  OMAR ARAUJO  29y  Female [id number]    HPI:   30yo  (LMP unsure) presents with abdominal pain and vaginal bleeding s/p MTX () for L sided ectopic. Pt reports sudden lower abdominal cramping this morning that improved since arriving to ED, patient received Tylenol at 2PM. Pt also reports vaginal bleeding starting this morning saturating 2 small pads today. Denies any n/v, lightheadedness, dizziness, fevers, chills.  Day 7 bHCG downtrended 19% from day 4 bHCG, however, bHCG today increased from 3860 to 4361.     last NPO@12p.     bHC()->4699(4/15,d4)->3860(,d7)->4361()     Name of GYN Physician: Dr. Tolbert    POB: medical TOPx1, Missed ABx1 s/p medical management  Pgyn: Denies fibroids, cysts, endometriosis, STI's, Abnormal pap smears   PMHX: denies  PSHx: denies  Meds: denies  All: NKDA  Social History:  Denies smoking use, drug use, alcohol use.       Vital Signs Last 24 Hrs  T(C): 36.7 (2025 13:25), Max: 36.7 (2025 13:25)  T(F): 98 (2025 13:25), Max: 98 (2025 13:25)  HR: 85 (2025 13:25) (85 - 85)  BP: 128/62 (2025 13:25) (128/62 - 128/62)  BP(mean): --  RR: 18 (2025 13:25) (18 - 18)  SpO2: 98% (2025 13:25) (98% - 98%)    Parameters below as of 2025 13:25  Patient On (Oxygen Delivery Method): room air        Physical Exam:   General: sitting comfortably cross-legged in bed, NAD   Lungs: Breathing comfortably on room air   Abd: Soft, mildly-tender over suprapubic region, non-distended.  No rebound/guarding.   :  Spotting on pad.    External labia wnl.  Bimanual exam with no cervical motion tenderness, uterus wnl, adnexa non palpable b/l.    Speculum Exam: No active bleeding from os.  Physiologic discharge.    Ext: non-tender b/l, no edema     LABS:                            14.0   16.29 )-----------( 270      ( 2025 14:00 )             42.7         137  |  100  |  11  ----------------------------<  99  4.3   |  25  |  0.76    Ca    9.0      2025 14:00    TPro  7.1  /  Alb  4.3  /  TBili  <0.2  /  DBili  x   /  AST  19  /  ALT  8   /  AlkPhos  58      I&O's Detail    PT/INR - ( 2025 14:00 )   PT: 10.2 sec;   INR: <0.90 ratio         PTT - ( 2025 14:00 )  PTT:25.7 sec  Urinalysis Basic - ( 2025 14:00 )    Color: x / Appearance: x / SG: x / pH: x  Gluc: 99 mg/dL / Ketone: x  / Bili: x / Urobili: x   Blood: x / Protein: x / Nitrite: x   Leuk Esterase: x / RBC: x / WBC x   Sq Epi: x / Non Sq Epi: x / Bacteria: x        RADIOLOGY & ADDITIONAL STUDIES:  TVUS:     ACC: [id number] EXAM:  US TRANSVAGINAL   ORDERED BY: ELEUTERIO ROSAS     PROCEDURE DATE:  2025          INTERPRETATION:  CLINICAL INFORMATION: Concern for ruptured ectopic.   History of left adnexal ectopic status post methotrexate on 2025    LMP: Unknown    COMPARISON: None available.    TECHNIQUE:  Endovaginal pelvic sonogram only. Color and Spectral Doppler was   performed.    FINDINGS:  Uterus: 7.4 cm x 3.3 cm x 4.4 cm. Within normal limits.  Endometrium: 6 mm. Within normal limits.    Right ovary: 3.8 cm x 2.2 cm x 1.7 cm. Right-sided corpus luteal cyst   measuring up to 1.2 cm. Normal arterial and venous waveforms.  Left ovary: 2.5 cm x 1.5 cm x 1.7 cm. Rounded centrally hypoechoic with   minimal debris structure in the left adnexa with peripheral echogenicity,   consistent with ectopic pregnancy, measuring 1.2 x 1.2 x 1.7 cm Normal   arterial and venous waveforms.    Fluid: None.    IMPRESSION:  Rounded structure in the left adnexa which is centrally hypoechoic and   with peripheral echogenic rim consistent with an ectopic pregnancy. No   evidence of pelvic free fluid to suggest rupture as clinically   questioned. The adnexal mass is similar in size or slightly larger than   previous seen.    Normal arterial and venous waveforms are demonstrated in both ovaries    Findings were discussed with Dr. ELEUTERIO ROSAS [phone] 2025   5:13 PM by Dr. Hollins with read back confirmation.    --- End of Report ---          YOHAN HOLLINS MD; Resident Radiologist  This document has been electronically signed.  MAURICE MCCORD MD; Attending Radiologist  This document has been electronically signed. 2025  5:20PM

## 2025-04-21 NOTE — ED PROVIDER NOTE - NSFOLLOWUPINSTRUCTIONS_ED_ALL_ED_FT
Seek immediate medical assistance for any new or worsening symptoms. If you have issues obtaining follow up, please call: [phone] or [phone]  to obtain a doctor or specialist who takes your insurance in your area.     Must return on APRIL 24th for repeat dose of methotrexate and repeat blood work.

## 2025-04-21 NOTE — ED PROVIDER NOTE - BIRTH SEX
Hospitalist Progress Note      PCP: Loretta Galvan    Date of Admission: 6/19/2021    Chief Complaint: Cough shortness of breath    Hospital Course:   [de-identified] y.o. female who presented to [facility] with above complaints  Patient presents to the ED today with complaints of cough, shortness of breath, generalized weakness. Patient reports she has been sick for almost a week. Has been having worsening generalized fatigue and weakness. Reports productive cough with yellowish sputum, no hemoptysis. This is associated with shortness of breath that is worse with exertion. Denies any chest pain. Denies subjective fevers chills or rigors. Denies any nausea vomiting or abdominal pain. She has chronic pain is on a pain pump and reports the pain pump has not been working well, resulting in her being in more pain. She reports she had the Covid vaccine back in February. Otherwise denies any sick contacts or recent travel. Subjective: patient feels much improved today. Still having baseline back pain. Spine surgery consulted.       Medications:  Reviewed    Infusion Medications    sodium chloride      dextrose       Scheduled Medications    tiZANidine  4 mg Oral 4x Daily    gabapentin  800 mg Oral TID    losartan  50 mg Oral Daily    atorvastatin  10 mg Oral Daily    doxazosin  2 mg Oral Daily    insulin lispro protamine & lispro  20 Units Subcutaneous Daily with breakfast    vitamin D3  400 Units Oral Daily    HYDROcodone-acetaminophen  1 tablet Oral TID    levothyroxine  250 mcg Oral Daily    sodium chloride flush  10 mL Intravenous 2 times per day    enoxaparin  40 mg Subcutaneous Daily    cefTRIAXone (ROCEPHIN) IV  1,000 mg Intravenous Q24H    And    doxycycline hyclate  100 mg Oral 2 times per day    insulin lispro  0-6 Units Subcutaneous TID WC    insulin lispro  0-3 Units Subcutaneous Nightly     PRN Meds: naloxone, ipratropium-albuterol, diclofenac sodium, sodium chloride flush, sodium chloride, ondansetron **OR** ondansetron, polyethylene glycol, acetaminophen **OR** acetaminophen, glucose, dextrose, glucagon (rDNA), dextrose      Intake/Output Summary (Last 24 hours) at 6/22/2021 1220  Last data filed at 6/22/2021 0429  Gross per 24 hour   Intake 1775.94 ml   Output 500 ml   Net 1275.94 ml       Physical Exam Performed:    BP (!) 159/74   Pulse 97   Temp 98.1 °F (36.7 °C) (Oral)   Resp 18   Ht 5' (1.524 m)   Wt 237 lb (107.5 kg)   SpO2 94%   BMI 46.29 kg/m²     General appearance: No apparent distress, appears stated age and cooperative. HEENT: Pupils equal, round, and reactive to light. Conjunctivae/corneas clear. Neck: Supple, with full range of motion. No jugular venous distention. Trachea midline. Respiratory:  Normal respiratory effort. Clear to auscultation, bilaterally without Rales/Wheezes/Rhonchi. Cardiovascular: Regular rate and rhythm with normal S1/S2 without murmurs, rubs or gallops. Abdomen: Soft, non-tender, non-distended with normal bowel sounds. Musculoskeletal: No clubbing, cyanosis or edema bilaterally. Full range of motion without deformity. Skin: Skin color, texture, turgor normal.  No rashes or lesions. Neurologic:  Neurovascularly intact without any focal sensory/motor deficits.  Cranial nerves: II-XII intact, grossly non-focal.  Psychiatric: Alert and oriented, thought content appropriate, normal insight  Capillary Refill: Brisk,3 seconds, normal   Peripheral Pulses: +2 palpable, equal bilaterally       Labs:   Recent Labs     06/20/21  0352 06/21/21  0550 06/22/21  0600   WBC 16.9* 9.7 9.9   HGB 11.4* 11.9* 12.4   HCT 35.0* 35.6* 37.1    222 255     Recent Labs     06/20/21  0352 06/21/21  0549 06/22/21  0600    144 142   K 3.9 3.8 3.9    108 106   CO2 29 27 25   BUN 13 11 10   CREATININE 0.6 <0.5* <0.5*   CALCIUM 9.3 9.6 9.9     Recent Labs     06/19/21 1956   AST 22   ALT 11   BILITOT 0.7   ALKPHOS 76     No results for input(s): INR in the last 72 hours. Recent Labs     06/19/21 1956   TROPONINI <0.01       Urinalysis:      Lab Results   Component Value Date    NITRU Negative 06/21/2021    WBCUA 3-5 06/21/2021    BACTERIA 1+ 11/02/2013    RBCUA None seen 06/21/2021    BLOODU Negative 06/21/2021    SPECGRAV 1.010 06/21/2021    GLUCOSEU Negative 06/21/2021    GLUCOSEU NEGATIVE 06/06/2012       Radiology:  XR CHEST PORTABLE   Final Result   Limited evaluation. Suspected airspace disease right lower lung, atelectasis versus pneumonia   versus aspiration. Assessment/Plan:    Active Hospital Problems    Diagnosis     Morbid obesity with BMI of 45.0-49.9, adult ([facility] [address] [E66.01, Z68.42]     PNA (pneumonia) [J18.9]     Hypothyroid [E03.9]     Chronic pain disorder [G89.4]     Hypertension [I10]     Diabetes mellitus ([facility] [address] [E11.9]      Pneumonia  - suspect gram positive  - continue ceftriaxone, doxycycline  - no evidence of sepsis on admission  - stable on room air    DMII  - well controlled  - holding home oral meds  - SSI ordered    HTN  - poorly controlled  - continue home losartan, cardura    HLD  - continue home statin    Hypothyroidism  - continue home synthroid    Opioid dependence 2/2 chronic pain syndrome  - continue home pain regimen  - OARRS reviewed    Spinal stenosis  - has close outpatient follow up  - spine surgery consulted. Is due for CT scan of T and L spine  - home pain regimen ordered    Morbid obesity  With Body mass index is 86.02 kg/m². Complicating assessment and treatment. Placing patient at risk for multiple co-morbidities as well as early death and contributing to the patient's presentation. Counseled on weight loss. DVT Prophylaxis: Lovenox subcu  Diet: ADULT DIET; Regular; 4 carb choices (60 gm/meal)  Code Status: Full Code    PT/OT Eval Status: Consulted    Dispo - plan for SNF.  Pre-cert pending    Teja Feldman MD Female

## 2025-04-21 NOTE — ED PROVIDER NOTE - PHYSICAL EXAMINATION
Vitals are stable but patient appears very uncomfortable.  There is significant tenderness to the left lower quadrant with mild rebound.  Patient also has tenderness with referred pain to the left side whenever the rest of the abdomen is palpated.  There is also some mild left CVA tenderness as well.

## 2025-04-21 NOTE — CONSULT NOTE ADULT - ASSESSMENT
Sonogram reviewed with attending, undiagnosed sonogram. Requesting official radiology sono as patient is currently vitally stable with minimal abdominal pain after Tylenol.  A/P: 28yo  (LMP unsure) presents with abdominal pain and vaginal bleeding s/p MTX () for L sided ectopic. Pt reports sudden lower abdominal cramping this morning that improved since arriving to ED, patient received Tylenol at 2PM. Pt also reports vaginal bleeding starting this morning saturating 2 small pads today. Denies any n/v, lightheadedness, dizziness, fevers, chills.  Day 7 bHCG downtrended 19% from day 4 bHCG, however, bHCG today increased from 3860 to 4361.   - bHC()->4699(4/15,d4)->3860(,d7)->4361()   - f/u UA   - Sonogram reviewed with attending, undiagnosed sonogram. Requesting official radiology sono as patient is currently vitally stable with minimal abdominal pain after Tylenol.     d/w Dr. Mitchell,  Haydee Chandra, PGY2  A/P: 28yo  (LMP unsure) presents with abdominal pain and vaginal bleeding s/p MTX () for L sided ectopic. Pt reports sudden lower abdominal cramping this morning that improved since arriving to ED, patient received Tylenol at 2PM. Pt also reports vaginal bleeding starting this morning saturating 2 small pads today. Denies any n/v, lightheadedness, dizziness, fevers, chills.  Day 7 bHCG downtrended 19% from day 4 bHCG, however, bHCG today increased from 3860 to 4361. VSS. H/H stable. F/u TVUS.   - bHC()->4699(4/15,d4)->3860(,d7)->4361()   - f/u UA   - Sonogram reviewed with attending, undiagnosed sonogram. Requesting official radiology sono as patient is currently vitally stable with minimal abdominal pain after Tylenol.     d/w Dr. Mitchell,  Haydee Chandra, PGY2  A/P: 28yo  (LMP unsure) presents with abdominal pain and vaginal bleeding s/p MTX () for L sided ectopic. Pt reports sudden lower abdominal cramping this morning that improved since arriving to ED, patient received Tylenol at 2PM. Pt also reports vaginal bleeding starting this morning saturating 2 small pads today. Denies any n/v, lightheadedness, dizziness, fevers, chills.  Day 7 bHCG downtrended 19% from day 4 bHCG, however, bHCG today increased from 3860 to 4361. VSS. H/H stable. Abdominal exam overall benign. F/u TVUS.   - bHC()->4699(4/15,d4)->3860(,d7)->4361()   - f/u UA   - Sonogram reviewed with attending, undiagnosed sonogram. Requesting official radiology sono as patient is currently vitally stable with minimal abdominal pain after Tylenol.     d/w Dr. Mitchell,  Haydee Chandra, PGY2       Addendum:   Patient reexamined with Dr. Olivas. TVUS shows similar size L ectopic 1.2cm with no free fluid. With stable H/H, VSS, benign abdomen ectopic unlikely to be ruptured. Discussed with patient the option of a laparoscopic left salpingectomy vs a second dose of methotrexate. Patient opts for medical management.   - Patient to receive two dose methotrexate therapy, first dose today, second dose on day 4    - Patient counseled on methotrexate therapy as treatment for ectopic pregnancy.  Common side effects of the medication as well as restrictions while on the medication were reviewed with patient and patient signed methotrexate information sheet that was placed in her chart.    -Reviewed with patient the 15-20% methotrexate failure rate and possibility of necessity for additional dose of methotrexate.   -Consents for methotrexate signed with patient with attending at bedside.    -Chemotherapy drug order form sent to pharmacy with methotrexate dose calculated based on BSA for patient.   -Patient given strict precautions to call her physician or return to ED if she experiences any severe abdominal pain, dizziness, lightheadedness, severe n/v, or any heavy vaginal bleeding >2 pads/hour for >2 hours.    -Patient instructed on need for follow up of b-hcg on day 4 and day 7 of methotrexate therapy.    -Once patient receives methotrexate therapy she is cleared for discharge from OBGYN perspective.  Primary managment per ED team.     D/w and seen w/ Dr. Olivas,   Haydee Chandra, PGY2  A/P: 30yo  (LMP unsure) presents with abdominal pain and vaginal bleeding s/p MTX () for L sided ectopic. Pt reports sudden lower abdominal cramping this morning that improved since arriving to ED, patient received Tylenol at 2PM. Pt also reports vaginal bleeding starting this morning saturating 2 small pads today. Denies any n/v, lightheadedness, dizziness, fevers, chills.  Day 7 bHCG downtrended 19% from day 4 bHCG, however, bHCG today increased from 3860 to 4361. VSS. H/H stable. Abdominal exam overall benign. F/u TVUS.   - bHC()->4699(4/15,d4)->3860(,d7)->4361()   - f/u UA   - Sonogram reviewed with attending, undiagnosed sonogram. Requesting official radiology sono as patient is currently vitally stable with minimal abdominal pain after Tylenol.     d/w Dr. Mitchell,  Haydee Chandra, PGY2       Addendum:   Patient reexamined with Dr. Olivas. TVUS shows similar size L ectopic 1.2cm with no free fluid. With stable H/H, VSS, benign abdomen ectopic unlikely to be ruptured. Discussed with patient the option of a laparoscopic left salpingectomy vs a second dose of methotrexate. Patient opts for medical management.   - Patient to receive two dose methotrexate therapy, first dose today, second dose on day 4    - Patient counseled on methotrexate therapy as treatment for ectopic pregnancy.  Common side effects of the medication as well as restrictions while on the medication were reviewed with patient and patient signed methotrexate information sheet that was placed in her chart.    -Reviewed with patient the 15-20% methotrexate failure rate and possibility of necessity for additional dose of methotrexate.   -Consents for methotrexate signed with patient with attending at bedside.    -Chemotherapy drug order form sent to pharmacy with methotrexate dose calculated based on BSA for patient.   -Patient given strict precautions to call her physician or return to ED if she experiences any severe abdominal pain, dizziness, lightheadedness, severe n/v, or any heavy vaginal bleeding >2 pads/hour for >2 hours.    -Patient instructed on need for follow up of b-hcg on day 4 and day 7 of methotrexate therapy.    -Once patient receives methotrexate therapy she is cleared for discharge from OBGYN perspective.  Primary management per ED team.     D/w and seen w/ Dr. Olivas,   Haydee Chandra, PGY2

## 2025-04-21 NOTE — ED ADULT NURSE REASSESSMENT NOTE - NS ED NURSE REASSESS COMMENT FT1
medication given as per MDs orders. no s/s of adverse reaction noted. vss, nad, pt pending dispo. Advancement-Rotation Flap Text: The defect edges were debeveled with a #15 scalpel blade.  Given the location of the defect, shape of the defect and the proximity to free margins an advancement-rotation flap was deemed most appropriate.  Using a sterile surgical marker, an appropriate flap was drawn incorporating the defect and placing the expected incisions within the relaxed skin tension lines where possible. The area thus outlined was incised deep to adipose tissue with a #15 scalpel blade.  The skin margins were undermined to an appropriate distance in all directions utilizing iris scissors.

## 2025-04-21 NOTE — ED PROVIDER NOTE - PATIENT PORTAL LINK FT
You can access the FollowMyHealth Patient Portal offered by [facility] by registering at the following website: [email or web address]. By joining [facility]’s FollowMyHealth portal, you will also be able to view your health information using other applications (apps) compatible with our system.

## 2025-04-21 NOTE — ED PROVIDER NOTE - PROGRESS NOTE DETAILS
see OBGYN note for interval details.   An extensive discussion was had with the patient regarding labs/imaging and tests prior to discharge. We discussed in depth the importance of follow up for continuing medical care as an outpatient. The patient was advised to return the Emergency Department for worsening or persistent symptoms, and/or any new or concerning symptoms.

## 2025-04-21 NOTE — ED ADULT TRIAGE NOTE - SPO2 (%)
Detail Level: Zone Otc Regimen: Tariq Escalante SA Initiate Treatment: triamcinolone acetonide 0.1 % topical cream 98

## 2025-04-21 NOTE — ED PROVIDER NOTE - OBJECTIVE STATEMENT
29-year-old female G3, P0 10 days status post methotrexate for left sided tubal ectopic pregnancy.  Patient's beta initially risen then fallen slightly.  Patient was doing well initially.  Patient presented to the ED with left-sided pain and was diagnosed with ectopic but after that patient's cramping and bleeding had all resolved.  Today patient had significant cramping on left side rating to the back and heavy vaginal bleeding.  Patient states this pain is worse than when she was diagnosed with a ectopic.  Patient denies any lightheadedness or dizziness or nausea or vomiting.

## 2025-04-24 ENCOUNTER — EMERGENCY (EMERGENCY)
Facility: HOSPITAL | Age: 30
LOS: 1 days | End: 2025-04-24
Attending: PERSONAL EMERGENCY RESPONSE ATTENDANT | Admitting: PERSONAL EMERGENCY RESPONSE ATTENDANT
Payer: COMMERCIAL

## 2025-04-24 VITALS
WEIGHT: 139.99 LBS | TEMPERATURE: 98 F | HEART RATE: 95 BPM | HEIGHT: 64 IN | DIASTOLIC BLOOD PRESSURE: 76 MMHG | OXYGEN SATURATION: 99 % | RESPIRATION RATE: 16 BRPM | SYSTOLIC BLOOD PRESSURE: 103 MMHG

## 2025-04-24 VITALS
DIASTOLIC BLOOD PRESSURE: 60 MMHG | OXYGEN SATURATION: 100 % | TEMPERATURE: 98 F | HEART RATE: 84 BPM | SYSTOLIC BLOOD PRESSURE: 106 MMHG | RESPIRATION RATE: 16 BRPM

## 2025-04-24 LAB
ALBUMIN SERPL ELPH-MCNC: 4.3 G/DL — SIGNIFICANT CHANGE UP (ref 3.3–5)
ALP SERPL-CCNC: 66 U/L — SIGNIFICANT CHANGE UP (ref 40–120)
ALT FLD-CCNC: 96 U/L — HIGH (ref 4–33)
ANION GAP SERPL CALC-SCNC: 11 MMOL/L — SIGNIFICANT CHANGE UP (ref 7–14)
APTT BLD: 27.8 SEC — SIGNIFICANT CHANGE UP (ref 26.1–36.8)
AST SERPL-CCNC: 59 U/L — HIGH (ref 4–32)
BASOPHILS # BLD AUTO: 0.05 K/UL — SIGNIFICANT CHANGE UP (ref 0–0.2)
BASOPHILS NFR BLD AUTO: 0.4 % — SIGNIFICANT CHANGE UP (ref 0–2)
BILIRUB SERPL-MCNC: <0.2 MG/DL — SIGNIFICANT CHANGE UP (ref 0.2–1.2)
BLD GP AB SCN SERPL QL: NEGATIVE — SIGNIFICANT CHANGE UP
BUN SERPL-MCNC: 14 MG/DL — SIGNIFICANT CHANGE UP (ref 7–23)
CALCIUM SERPL-MCNC: 9.3 MG/DL — SIGNIFICANT CHANGE UP (ref 8.4–10.5)
CHLORIDE SERPL-SCNC: 98 MMOL/L — SIGNIFICANT CHANGE UP (ref 98–107)
CO2 SERPL-SCNC: 25 MMOL/L — SIGNIFICANT CHANGE UP (ref 22–31)
CREAT SERPL-MCNC: 0.83 MG/DL — SIGNIFICANT CHANGE UP (ref 0.5–1.3)
EGFR: 99 ML/MIN/1.73M2 — SIGNIFICANT CHANGE UP
EGFR: 99 ML/MIN/1.73M2 — SIGNIFICANT CHANGE UP
EOSINOPHIL # BLD AUTO: 0.17 K/UL — SIGNIFICANT CHANGE UP (ref 0–0.5)
EOSINOPHIL NFR BLD AUTO: 1.5 % — SIGNIFICANT CHANGE UP (ref 0–6)
GLUCOSE SERPL-MCNC: 76 MG/DL — SIGNIFICANT CHANGE UP (ref 70–99)
HCG SERPL-ACNC: 4107 MIU/ML — SIGNIFICANT CHANGE UP
HCT VFR BLD CALC: 37.8 % — SIGNIFICANT CHANGE UP (ref 34.5–45)
HGB BLD-MCNC: 12.8 G/DL — SIGNIFICANT CHANGE UP (ref 11.5–15.5)
IANC: 6.65 K/UL — SIGNIFICANT CHANGE UP (ref 1.8–7.4)
IMM GRANULOCYTES NFR BLD AUTO: 0.4 % — SIGNIFICANT CHANGE UP (ref 0–0.9)
INR BLD: <0.9 RATIO — SIGNIFICANT CHANGE UP (ref 0.85–1.16)
LYMPHOCYTES # BLD AUTO: 35.8 % — SIGNIFICANT CHANGE UP (ref 13–44)
LYMPHOCYTES # BLD AUTO: 4.09 K/UL — HIGH (ref 1–3.3)
MCHC RBC-ENTMCNC: 30.5 PG — SIGNIFICANT CHANGE UP (ref 27–34)
MCHC RBC-ENTMCNC: 33.9 G/DL — SIGNIFICANT CHANGE UP (ref 32–36)
MCV RBC AUTO: 90 FL — SIGNIFICANT CHANGE UP (ref 80–100)
MONOCYTES # BLD AUTO: 0.41 K/UL — SIGNIFICANT CHANGE UP (ref 0–0.9)
MONOCYTES NFR BLD AUTO: 3.6 % — SIGNIFICANT CHANGE UP (ref 2–14)
NEUTROPHILS # BLD AUTO: 6.65 K/UL — SIGNIFICANT CHANGE UP (ref 1.8–7.4)
NEUTROPHILS NFR BLD AUTO: 58.3 % — SIGNIFICANT CHANGE UP (ref 43–77)
NRBC # BLD AUTO: 0 K/UL — SIGNIFICANT CHANGE UP (ref 0–0)
NRBC # FLD: 0 K/UL — SIGNIFICANT CHANGE UP (ref 0–0)
NRBC BLD AUTO-RTO: 0 /100 WBCS — SIGNIFICANT CHANGE UP (ref 0–0)
PLATELET # BLD AUTO: 254 K/UL — SIGNIFICANT CHANGE UP (ref 150–400)
POTASSIUM SERPL-MCNC: 4.8 MMOL/L — SIGNIFICANT CHANGE UP (ref 3.5–5.3)
POTASSIUM SERPL-SCNC: 4.8 MMOL/L — SIGNIFICANT CHANGE UP (ref 3.5–5.3)
PROT SERPL-MCNC: 7.3 G/DL — SIGNIFICANT CHANGE UP (ref 6–8.3)
PROTHROM AB SERPL-ACNC: 10.6 SEC — SIGNIFICANT CHANGE UP (ref 9.9–13.4)
RBC # BLD: 4.2 M/UL — SIGNIFICANT CHANGE UP (ref 3.8–5.2)
RBC # FLD: 14.1 % — SIGNIFICANT CHANGE UP (ref 10.3–14.5)
RH IG SCN BLD-IMP: POSITIVE — SIGNIFICANT CHANGE UP
SODIUM SERPL-SCNC: 134 MMOL/L — LOW (ref 135–145)
WBC # BLD: 11.41 K/UL — HIGH (ref 3.8–10.5)
WBC # FLD AUTO: 11.41 K/UL — HIGH (ref 3.8–10.5)

## 2025-04-24 PROCEDURE — 99284 EMERGENCY DEPT VISIT MOD MDM: CPT

## 2025-04-24 RX ORDER — ACETAMINOPHEN 500 MG/5ML
1000 LIQUID (ML) ORAL ONCE
Refills: 0 | Status: COMPLETED | OUTPATIENT
Start: 2025-04-24 | End: 2025-04-24

## 2025-04-24 RX ADMIN — Medication 400 MILLIGRAM(S): at 18:44

## 2025-04-24 NOTE — ED PROVIDER NOTE - NSFOLLOWUPINSTRUCTIONS_ED_ALL_ED_FT
Ectopic Pregnancy    An ectopic pregnancy happens when a fertilized egg attaches (implants) outside the uterus. In a normal pregnancy, a fertilized egg implants in the uterus. An ectopic pregnancy cannot develop into a healthy baby. Most ectopic pregnancies occur in one of the fallopian tubes, which is where an egg travels from an ovary to get to the uterus. This is called a tubal pregnancy. An ectopic pregnancy can also happen on an ovary, on the cervix, or in the abdomen.    When a fertilized egg implants on tissue outside the uterus and begins to grow, it may cause the tissue to tear or burst. This is known as a ruptured ectopic pregnancy. The tear or burst causes internal bleeding. This may cause intense pain in the abdomen. An ectopic pregnancy is a medical emergency and can be life-threatening.    What are the causes?  The most common cause of this condition is damage to one of the fallopian tubes. A fallopian tube may be narrowed or blocked, and that stops the fertilized egg from reaching the uterus.    Sometimes, the cause of this condition is not known.    What increases the risk?  The following factors may make you more likely to develop this condition:  Having gone through infertility treatment before.  Having had an ectopic pregnancy before.  Having had surgery to have the fallopian tubes tied.  Becoming pregnant while using an intrauterine device for birth control.  Taking birth control pills before the age of 16.  Other risk factors include:  Smoking.  Alcohol use.  History of ZEFERINO exposure. ZEFERINO is a medicine that was used until 1971 and affected babies whose mothers took the medicine.  What are the signs or symptoms?  Common symptoms of this condition include:  Missing a menstrual period.  Nausea or tiredness.  Tender breasts.  Other normal pregnancy symptoms.  Other symptoms may include:  Pain during sex.  Vaginal bleeding or spotting.  Cramping or pain in the lower abdomen.  A fast heartbeat, low blood pressure, and sweating.  Pain or increased pressure while having a bowel movement.  Symptoms of a ruptured ectopic pregnancy and internal bleeding may include:  Sudden, severe pain in the abdomen.  Dizziness, weakness, feeling light-headed, or fainting.  Pain in the shoulder or neck area.  How is this diagnosed?  This condition is diagnosed by:  A blood test to check for the pregnancy hormone.  A pelvic exam to find painful areas or a mass in the abdomen.  Ultrasound. A probe is inserted into the vagina to see if there is a pregnancy in or outside the uterus.  Taking a sample of tissue from the uterus.  Surgery to look closely at the fallopian tubes through an incision in the abdomen.  How is this treated?  This condition is usually treated with medicine or surgery. Sometimes, ectopic pregnancies can resolve on their own, under close monitoring by your health care provider.    Medicine    A medicine called methotrexate may be given to cause the pregnancy tissue to be absorbed. The medicine may be given if:  The diagnosis is made early, with no signs of active bleeding.  The fallopian tube has not torn or burst.  You will need blood tests to make sure the medicine is working. It may take 4–6 weeks for the pregnancy tissues to be absorbed.    Surgery    Surgery may be performed to:  Remove the pregnancy tissue.  Stop internal bleeding.  Remove part or all of the fallopian tube.  Remove the uterus. This is rare.  After surgery, you may need to have blood tests to make sure the surgery worked.    Follow these instructions at home:  Medicines    Take over-the-counter and prescription medicines only as told by your health care provider.  Ask your health care provider if the medicine prescribed to you:  Requires you to avoid driving or using machinery.  Can cause constipation. You may need to take these actions to prevent or treat constipation:  Drink enough fluid to keep your urine pale yellow.  Take over-the-counter or prescription medicines.  Eat foods that are high in fiber, such as beans, whole grains, and fresh fruits and vegetables.  Limit foods that are high in fat and processed sugars, such as fried or sweet foods.  General instructions    Rest or limit your activity, if told by your health care provider.  Do not have sex or put anything in your vagina, such as tampons or douches, for 6 weeks or until your health care provider says it is safe.  Do not lift anything that is heavier than 10 lb (4.5 kg), or the limit that you are told, until your health care provider says that it is safe.  Return to your normal activities as told by your health care provider. Ask your health care provider what activities are safe for you.  Keep all follow-up visits. This is important.  Contact a health care provider if:  You have a fever or chills.  You have nausea and vomiting.  Get help right away if:  Your pain gets worse or is not relieved by medicine.  You feel dizzy or weak.  You feel light-headed or you faint.  You have sudden, severe pain in your abdomen.  You have sudden pain in the shoulder or neck area.  Summary  An ectopic pregnancy happens when a fertilized egg implants outside the uterus. Most ectopic pregnancies occur in one of the fallopian tubes.  An ectopic pregnancy is a medical emergency and can be life-threatening.  The most common cause of this condition is damage to one of the fallopian tubes.  This condition is usually treated with medicine or surgery. Some ectopic pregnancies resolve on their own, under close monitoring by your health care provider.  This information is not intended to replace advice given to you by your health care provider. Make sure you discuss any questions you have with your health care provider.

## 2025-04-24 NOTE — ED PROVIDER NOTE - ATTENDING CONTRIBUTION TO CARE
Attending MD Rollins:  I performed a history and physical exam of the patient and discussed their management with the resident. I reviewed the resident's note and agree with the documented findings and plan of care. My medical decision making and observations are found above.    Attending MD Rollins.  Agree with above. Pt is a 28 yo fem  who represents to Ed with known ectopic s/p first MTX dose 3 days ago when beta uptrended again in setting of suprapubic cramping and vaginal bleeding.  Pt presents today for repeat OB eval and beta check and prob rep MTX.  I have seen and examined this pt with resident.

## 2025-04-24 NOTE — CONSULT NOTE ADULT - SUBJECTIVE AND OBJECTIVE BOX
OMAR ARAUJO  29y  Female [id number]    HPI:  30yo  (LMP unsure) presents for day 4bHCG. She is s/p 2nd dose MTX (). She reports mild abdominal pain improved from when last seen on . She has mild vaginal spotting. She denies any n/v, lightheadedness, dizziness, fevers, chills.  Day 7 bHCG downtrended 19% from day 4 bHCG, however, bHCG today increased from 3860 to 4361.     last NPO@12p.     bHC()->4699(4/15,d4)->3860(,d7)->4361()     Name of GYN Physician: Dr. Tolbert    POB: medical TOPx1, Missed ABx1 s/p medical management  Pgyn: Denies fibroids, cysts, endometriosis, STI's, Abnormal pap smears   PMHX: denies  PSHx: denies  Meds: denies  All: NKDA  Social History:  Denies smoking use, drug use, alcohol use.       Name of GYN Physician:     POB:    Pgyn: Denies fibroids, cysts, endometriosis, STI's, Abnormal pap smears   PMHX:  PSHx:  Meds:  All:  Social History:  Denies smoking use, drug use, alcohol use.   +occasional social alcohol use    Vital Signs Last 24 Hrs  T(C): 36.9 (2025 19:43), Max: 36.9 (2025 17:09)  T(F): 98.5 (2025 19:43), Max: 98.5 (2025 19:43)  HR: 84 (2025 19:43) (84 - 95)  BP: 106/60 (2025 19:43) (103/76 - 106/60)  BP(mean): --  RR: 16 (2025 19:43) (16 - 16)  SpO2: 100% (2025 19:43) (99% - 100%)    Parameters below as of 2025 19:43  Patient On (Oxygen Delivery Method): room air        Physical Exam:   General: sitting comftorably in bed, NAD   HEENT: neck supple, full ROM  CV: RR S1S2 no m/r/g  Lungs: CTA b/l, good air flow b/l   Back: No CVA tenderness  Abd: Soft, non-tender, non-distended.  Bowel sounds present.    :  No bleeding on pad.  External labia wnl.  Bimanual exam with no cervical motion tenderness, uterus wnl, adnexa non palpable  and nontender bilaterally.  Cervix closed vs. Cervix dilated    cm   Speculum Exam: No active bleeding from os.  Physiologic discharge.    Ext: non-tender b/l, no edema     LABS:                              12.8   11.41 )-----------( 254      ( 2025 18:45 )             37.8         134[L]  |  98  |  14  ----------------------------<  76  4.8   |  25  |  0.83    Ca    9.3      2025 18:45    TPro  7.3  /  Alb  4.3  /  TBili  <0.2  /  DBili  x   /  AST  59[H]  /  ALT  96[H]  /  AlkPhos  66  -24    I&O's Detail    PT/INR - ( 2025 18:45 )   PT: 10.6 sec;   INR: <0.90 ratio         PTT - ( 2025 18:45 )  PTT:27.8 sec  Urinalysis Basic - ( 2025 18:45 )    Color: x / Appearance: x / SG: x / pH: x  Gluc: 76 mg/dL / Ketone: x  / Bili: x / Urobili: x   Blood: x / Protein: x / Nitrite: x   Leuk Esterase: x / RBC: x / WBC x   Sq Epi: x / Non Sq Epi: x / Bacteria: x        RADIOLOGY & ADDITIONAL STUDIES:

## 2025-04-24 NOTE — ED ADULT TRIAGE NOTE - CHIEF COMPLAINT QUOTE
Pt c/o ectopic pregnancy, diagnosed on 4/11 and was told to follow up for second dose of Methotrexate today. Pt endorsing L sided pelvic cramping. Pt received Methotrexate on 4/11 and 4/18, but returned to ED on 4/21 for worsening abdominal cramping, vaginal bleeding and increasing HCG levels. Pt received another dose of Methotrexate on 4/21. Denies chest pain, SOB, dizziness, heavy vaginal bleeding/discharge. Denies PHx.

## 2025-04-24 NOTE — CONSULT NOTE ADULT - ASSESSMENT
28yo  (LMP unsure) presents for day 4bHCG. She is s/p 2nd dose MTX (). Vitals stable. Physical exam benign. Pt clinically and hemodynamically stable.     bHC()->MTX->4699(4/15,d4)->3860(,d7)->4361() ->MTX->4107 ()    Plan  - Pt received full dose of 2nd MTX on  per chart review  - F/u for day 7 bHCG  - Patient given strict precautions to call her physician or return to ED if she experiences any severe abdominal pain, dizziness, lightheadedness, severe n/v, or any heavy vaginal bleeding >2 pads/hour for >2 hours.      Michelle Dale PGY2  d/w Dr. Hoang

## 2025-04-24 NOTE — ED PROVIDER NOTE - CLINICAL SUMMARY MEDICAL DECISION MAKING FREE TEXT BOX
29-year-old female G3, P2, unknown LMP presents ED for second dose of round 2 of methotrexate.,  Patient presenting with vaginal bleeding, abdominal cramping, status post methotrexate on 4/11 for left-sided ectopic.  Patient noticed sudden onset lower abdominal cramping, worse on the left than right, was evaluated on 4/11 found to have left-sided ectopic, given dose of methotrexate.  Returned on 4/18 for worsening abdominal cramping, vaginal bleeding bleeding through 1 pad every hour, received methotrexate around 2 on 4/21, was told to follow-up 4/24 for second round of methotrexate.  Of note patient hCG has been fluctuating, on 4/12 was 3000, 4/15 was 4.7K, 4/18 3.8K, 4/21 4.3K.  Patient endorses vaginal bleeding has significantly improved, bleeding through 1 pad every 2-3 hours, still endorsing mild abdominal cramping otherwise denies nausea vomiting diarrhea chest pain shortness of breath dysuria vaginal discharge.    VSS, 100s/70s. Clinically stable. PE, well appearing, no acute distress, AAOx3. NCAT, EOMI, normal conjunctiva, mucous membranes moist, LCTAB no w/r/c, no MRG, RRR, abd mild LLQ ttp, no peritoneal signs, no rebound tenderness or guarding, no CVA ttp, no focal neuro deficits, neurovascularly intact, no bruising, rashes, or erythema. Will assess w rpt labs, hcg check, TVUS. gyn cs, likely re-dose methotrexate, dispo pending Ronn

## 2025-04-24 NOTE — ED PROVIDER NOTE - PATIENT PORTAL LINK FT
You can access the FollowMyHealth Patient Portal offered by [facility] by registering at the following website: [email or web address]. By joining [facility]’s FollowMyHealth portal, you will also be able to view your health information using other applications (apps) compatible with our system.

## 2025-04-24 NOTE — ED PROVIDER NOTE - PHYSICAL EXAMINATION
Gen: AAOx3, non-toxic  Head: NCAT  HEENT: EOMI, oral mucosa moist, normal conjunctiva  Lung: CTAB, no respiratory distress, no wheezes/rhonchi/rales B/L,   CV: RRR, no murmurs, rubs or gallops  Abd: soft, mild LLQ ttp, no peritoneal signs, no guarding, no CVA tenderness  MSK: no visible deformities  Neuro: No focal sensory or motor deficits  Skin: Warm, well perfused, no rash  Psych: normal affect.

## 2025-04-24 NOTE — ED PROVIDER NOTE - OBJECTIVE STATEMENT
29-year-old female G3, P2, unknown LMP presents ED for second dose of round 2 of methotrexate.,  Patient presenting with vaginal bleeding, abdominal cramping, status post methotrexate on 4/11 for left-sided ectopic.  Patient noticed sudden onset lower abdominal cramping, worse on the left than right, was evaluated on 4/11 found to have left-sided ectopic, given dose of methotrexate.  Returned on 4/18 for worsening abdominal cramping, vaginal bleeding bleeding through 1 pad every hour, received methotrexate around 2 on 4/21, was told to follow-up 4/24 for second round of methotrexate.  Of note patient hCG has been fluctuating, on 4/12 was 3000, 4/15 was 4.7K, 4/18 3.8K, 4/21 4.3K.  Patient endorses vaginal bleeding has significantly improved, bleeding through 1 pad every 2-3 hours, still endorsing mild abdominal cramping otherwise denies nausea vomiting diarrhea chest pain shortness of breath dysuria vaginal discharge.

## 2025-04-27 ENCOUNTER — INPATIENT (INPATIENT)
Facility: HOSPITAL | Age: 30
LOS: 0 days | Discharge: HOME | End: 2025-04-28
Attending: OBSTETRICS & GYNECOLOGY | Admitting: OBSTETRICS & GYNECOLOGY
Payer: COMMERCIAL

## 2025-04-27 VITALS
DIASTOLIC BLOOD PRESSURE: 76 MMHG | HEART RATE: 85 BPM | OXYGEN SATURATION: 98 % | TEMPERATURE: 98 F | WEIGHT: 139.99 LBS | HEIGHT: 64 IN | SYSTOLIC BLOOD PRESSURE: 116 MMHG | RESPIRATION RATE: 18 BRPM

## 2025-04-27 VITALS
RESPIRATION RATE: 19 BRPM | DIASTOLIC BLOOD PRESSURE: 68 MMHG | SYSTOLIC BLOOD PRESSURE: 101 MMHG | OXYGEN SATURATION: 100 % | HEART RATE: 56 BPM

## 2025-04-27 DIAGNOSIS — O36.80X0 PREGNANCY WITH INCONCLUSIVE FETAL VIABILITY, NOT APPLICABLE OR UNSPECIFIED: ICD-10-CM

## 2025-04-27 LAB
ALBUMIN SERPL ELPH-MCNC: 4.3 G/DL — SIGNIFICANT CHANGE UP (ref 3.3–5)
ALP SERPL-CCNC: 62 U/L — SIGNIFICANT CHANGE UP (ref 40–120)
ALT FLD-CCNC: 41 U/L — HIGH (ref 4–33)
ANION GAP SERPL CALC-SCNC: 11 MMOL/L — SIGNIFICANT CHANGE UP (ref 7–14)
APTT BLD: 28.3 SEC — SIGNIFICANT CHANGE UP (ref 26.1–36.8)
AST SERPL-CCNC: 17 U/L — SIGNIFICANT CHANGE UP (ref 4–32)
BASOPHILS # BLD AUTO: 0.03 K/UL — SIGNIFICANT CHANGE UP (ref 0–0.2)
BASOPHILS NFR BLD AUTO: 0.3 % — SIGNIFICANT CHANGE UP (ref 0–2)
BILIRUB SERPL-MCNC: 0.2 MG/DL — SIGNIFICANT CHANGE UP (ref 0.2–1.2)
BLD GP AB SCN SERPL QL: NEGATIVE — SIGNIFICANT CHANGE UP
BUN SERPL-MCNC: 12 MG/DL — SIGNIFICANT CHANGE UP (ref 7–23)
CALCIUM SERPL-MCNC: 9.5 MG/DL — SIGNIFICANT CHANGE UP (ref 8.4–10.5)
CHLORIDE SERPL-SCNC: 100 MMOL/L — SIGNIFICANT CHANGE UP (ref 98–107)
CO2 SERPL-SCNC: 26 MMOL/L — SIGNIFICANT CHANGE UP (ref 22–31)
CREAT SERPL-MCNC: 0.72 MG/DL — SIGNIFICANT CHANGE UP (ref 0.5–1.3)
EGFR: 117 ML/MIN/1.73M2 — SIGNIFICANT CHANGE UP
EGFR: 117 ML/MIN/1.73M2 — SIGNIFICANT CHANGE UP
EOSINOPHIL # BLD AUTO: 0.08 K/UL — SIGNIFICANT CHANGE UP (ref 0–0.5)
EOSINOPHIL NFR BLD AUTO: 0.9 % — SIGNIFICANT CHANGE UP (ref 0–6)
GLUCOSE SERPL-MCNC: 76 MG/DL — SIGNIFICANT CHANGE UP (ref 70–99)
HCG SERPL-ACNC: 4047 MIU/ML — SIGNIFICANT CHANGE UP
HCT VFR BLD CALC: 39.1 % — SIGNIFICANT CHANGE UP (ref 34.5–45)
HGB BLD-MCNC: 13.3 G/DL — SIGNIFICANT CHANGE UP (ref 11.5–15.5)
IANC: 5.17 K/UL — SIGNIFICANT CHANGE UP (ref 1.8–7.4)
IMM GRANULOCYTES NFR BLD AUTO: 0.8 % — SIGNIFICANT CHANGE UP (ref 0–0.9)
INR BLD: 0.95 RATIO — SIGNIFICANT CHANGE UP (ref 0.85–1.16)
LYMPHOCYTES # BLD AUTO: 3.16 K/UL — SIGNIFICANT CHANGE UP (ref 1–3.3)
LYMPHOCYTES # BLD AUTO: 34.3 % — SIGNIFICANT CHANGE UP (ref 13–44)
MCHC RBC-ENTMCNC: 30.2 PG — SIGNIFICANT CHANGE UP (ref 27–34)
MCHC RBC-ENTMCNC: 34 G/DL — SIGNIFICANT CHANGE UP (ref 32–36)
MCV RBC AUTO: 88.7 FL — SIGNIFICANT CHANGE UP (ref 80–100)
MONOCYTES # BLD AUTO: 0.69 K/UL — SIGNIFICANT CHANGE UP (ref 0–0.9)
MONOCYTES NFR BLD AUTO: 7.5 % — SIGNIFICANT CHANGE UP (ref 2–14)
NEUTROPHILS # BLD AUTO: 5.17 K/UL — SIGNIFICANT CHANGE UP (ref 1.8–7.4)
NEUTROPHILS NFR BLD AUTO: 56.2 % — SIGNIFICANT CHANGE UP (ref 43–77)
NRBC # BLD AUTO: 0 K/UL — SIGNIFICANT CHANGE UP (ref 0–0)
NRBC # FLD: 0 K/UL — SIGNIFICANT CHANGE UP (ref 0–0)
NRBC BLD AUTO-RTO: 0 /100 WBCS — SIGNIFICANT CHANGE UP (ref 0–0)
PLATELET # BLD AUTO: 245 K/UL — SIGNIFICANT CHANGE UP (ref 150–400)
POTASSIUM SERPL-MCNC: 4.2 MMOL/L — SIGNIFICANT CHANGE UP (ref 3.5–5.3)
POTASSIUM SERPL-SCNC: 4.2 MMOL/L — SIGNIFICANT CHANGE UP (ref 3.5–5.3)
PROT SERPL-MCNC: 7.2 G/DL — SIGNIFICANT CHANGE UP (ref 6–8.3)
PROTHROM AB SERPL-ACNC: 11 SEC — SIGNIFICANT CHANGE UP (ref 9.9–13.4)
RBC # BLD: 4.41 M/UL — SIGNIFICANT CHANGE UP (ref 3.8–5.2)
RBC # FLD: 13.9 % — SIGNIFICANT CHANGE UP (ref 10.3–14.5)
RH IG SCN BLD-IMP: POSITIVE — SIGNIFICANT CHANGE UP
SODIUM SERPL-SCNC: 137 MMOL/L — SIGNIFICANT CHANGE UP (ref 135–145)
WBC # BLD: 9.2 K/UL — SIGNIFICANT CHANGE UP (ref 3.8–10.5)
WBC # FLD AUTO: 9.2 K/UL — SIGNIFICANT CHANGE UP (ref 3.8–10.5)

## 2025-04-27 PROCEDURE — 88305 TISSUE EXAM BY PATHOLOGIST: CPT | Mod: 26

## 2025-04-27 PROCEDURE — 76817 TRANSVAGINAL US OBSTETRIC: CPT | Mod: 26

## 2025-04-27 PROCEDURE — 99285 EMERGENCY DEPT VISIT HI MDM: CPT

## 2025-04-27 RX ORDER — HYDROMORPHONE/SOD CHLOR,ISO/PF 2 MG/10 ML
0.25 SYRINGE (ML) INJECTION
Refills: 0 | Status: DISCONTINUED | OUTPATIENT
Start: 2025-04-27 | End: 2025-04-28

## 2025-04-27 RX ORDER — ONDANSETRON HCL/PF 4 MG/2 ML
4 VIAL (ML) INJECTION ONCE
Refills: 0 | Status: DISCONTINUED | OUTPATIENT
Start: 2025-04-27 | End: 2025-04-28

## 2025-04-27 RX ORDER — OXYCODONE HYDROCHLORIDE 30 MG/1
1 TABLET ORAL
Qty: 6 | Refills: 0
Start: 2025-04-27

## 2025-04-27 RX ORDER — FENTANYL CITRATE-0.9 % NACL/PF 100MCG/2ML
50 SYRINGE (ML) INTRAVENOUS
Refills: 0 | Status: DISCONTINUED | OUTPATIENT
Start: 2025-04-27 | End: 2025-04-28

## 2025-04-27 RX ADMIN — Medication 0.25 MILLIGRAM(S): at 23:16

## 2025-04-27 NOTE — ASU DISCHARGE PLAN (ADULT/PEDIATRIC) - FINANCIAL ASSISTANCE
[facility] provides services at a reduced cost to those who are determined to be eligible through [facility]’s financial assistance program. Information regarding [facility]’s financial assistance program can be found by going to [email or web address] or by calling [phone].

## 2025-04-27 NOTE — ED PROVIDER NOTE - OBJECTIVE STATEMENT
30yo  (LMP unsure) presents for repeat HCG. She is s/p 2nd dose MTX (). 30yo  (LMP unsure) presents for repeat HCG after 7 days. She is s/p 2nd dose MTX (). Reports scant amn of vag. spotting.   Denies fever, chills, sob, chest pain, abd pain.

## 2025-04-27 NOTE — H&P ADULT - ATTENDING COMMENTS
Pt seen and evaluated. Plan for Laparoscopic removal of ectopic pregnancy. Risks, benefits discussed.  All questions answered.  Pt wishes to proceed.

## 2025-04-27 NOTE — ASU DISCHARGE PLAN (ADULT/PEDIATRIC) - NO TAMPONS DURATION
Patient c/o abnormal platelet count and low hemoglobin. Presents for blood transfusion. Patient recently dx with leukemia. Hemoglobin 6.8 and PLT 4. 2 weeks

## 2025-04-27 NOTE — ED ADULT NURSE NOTE - NSFALLUNIVINTERV_ED_ALL_ED
Bed/Stretcher in lowest position, wheels locked, appropriate side rails in place/Call bell, personal items and telephone in reach/Instruct patient to call for assistance before getting out of bed/chair/stretcher/Non-slip footwear applied when patient is off stretcher/[place] to call system/Physically safe environment - no spills, clutter or unnecessary equipment/Purposeful proactive rounding/Room/bathroom lighting operational, light cord in reach

## 2025-04-27 NOTE — H&P ADULT - ASSESSMENT
A&P: 28yo  (LMP unsure) with left adnexal ectopic who presents for day 7 bHCG after 2nd dose of MTX (). Patient's bHCG is stable after MTX indicating nonresponse to medical management. At this time, would not offer further medical management. Strong recommendation made to proceed to OR for diagnostic laparoscopy, left salpingectomy for ectopic pregnancy. Discussed risks, benefits and alternatives with patient and her partner. All questions answered to their satisfaction. Patient is currently vitally and clinically stable. Will obtain Pelvic US to assess for free fluid in case of asymptomatic rupture. Patient is NPO@5pm.     Plan:  - NPO, LR@125   - CBC, BMP, T&S  - Add on to OR     Maribell Nolan, PGY2  discussed with Dr. Olivas

## 2025-04-27 NOTE — H&P ADULT - HISTORY OF PRESENT ILLNESS
HPI: 28yo  (LMP unsure) presents for day 7 bHCG. She is s/p 2nd dose MTX (). She reports abdominal and pelvic pain have resolved. Feels well. Minimal vaginal spotting. Denies fevers, chills, chest pain, dyspnea, nausea, vomiting. Last ate at 11am.     bHC() MTX #1 ->4699(4/15,d4)->3860(,d7)->4361() MTX #2 -> 4361(, d4) -> 4107(, d7)    Name of GYN Physician: Dr. Tolbert    POB: medical TOPx1, Missed ABx1 s/p medical management  Pgyn: Denies fibroids, cysts, endometriosis, STI's, Abnormal pap smears   PMHX: denies  PSHx: denies  Meds: denies  All: NKDA  Social History:  Denies smoking use, drug use, alcohol use.

## 2025-04-27 NOTE — BRIEF OPERATIVE NOTE - OPERATION/FINDINGS
Atraumatic entry sites  On LSC, grossly normal uterus, b/l ovaries and right fallopian tube  Left fallopian tube with ectopic pregnancy is situ and intact.    Atraumatic entry sites  On LSC, grossly normal bowel, omentum, liver edge.   Grossly normal uterus, b/l ovaries and right fallopian tube  Left fallopian tube with ectopic pregnancy in situ and intact.

## 2025-04-27 NOTE — ASU DISCHARGE PLAN (ADULT/PEDIATRIC) - NS MD DC FALL RISK RISK
For information on Fall & Injury Prevention, visit: [email or web address] OR  [email or web address] OR  https://www.cdc.gov/steadi/patient.html

## 2025-04-27 NOTE — ASU DISCHARGE PLAN (ADULT/PEDIATRIC) - CARE PROVIDER_API CALL
Serenity Tolbert  Obstetrics and Gynecology  [address], [address]  [place] [address]  Phone: [phone]  Fax: [phone]  Follow Up Time: 2 weeks

## 2025-04-27 NOTE — ED ADULT NURSE NOTE - OBJECTIVE STATEMENT
Pt received to intake room 8, A&Ox4, ambulatory, accompanied by family member coming to the ED for follow up hcg s/p methotrexate. Endorsing mild vaginal bleeding at this time. Pt denies chest pain, sob, fevers, chills, N/V/D, abdominal pain, dizziness, blurry vision, headache, urinary symptoms. RR equal and unlabored on room air. butterflied for lab work. Care plan continued. Comfort measures provided. Safety maintained. Awaiting lab results.

## 2025-04-27 NOTE — ASU DISCHARGE PLAN (ADULT/PEDIATRIC) - CALL YOUR DOCTOR IF YOU HAVE ANY OF THE FOLLOWING:
Fever greater than (need to indicate Fahrenheit or Celsius) Pain not relieved by Medications/Fever greater than (need to indicate Fahrenheit or Celsius)/Wound/Surgical Site with redness, or foul smelling discharge or pus

## 2025-04-27 NOTE — H&P ADULT - NSHPPHYSICALEXAM_GEN_ALL_CORE
Vital Signs Last 24 Hrs  T(C): 36.7 (27 Apr 2025 10:43), Max: 36.7 (27 Apr 2025 10:43)  T(F): 98 (27 Apr 2025 10:43), Max: 98 (27 Apr 2025 10:43)  HR: 85 (27 Apr 2025 10:43) (85 - 85)  BP: 116/76 (27 Apr 2025 10:43) (116/76 - 116/76)  BP(mean): --  RR: 18 (27 Apr 2025 10:43) (18 - 18)  SpO2: 98% (27 Apr 2025 10:43) (98% - 98%)    Parameters below as of 27 Apr 2025 10:43  Patient On (Oxygen Delivery Method): room air    Physical Exam:   General: sitting comfortably in bed, NAD   CV: RR, well perfused   Resp: Breathing comfortably on RA  Abd: Soft, non-tender, non-distended.  Ext: non-tender b/l, no edema

## 2025-04-27 NOTE — ED PROVIDER NOTE - CLINICAL SUMMARY MEDICAL DECISION MAKING FREE TEXT BOX
30yo  (LMP unsure) presents for repeat HCG after 7 days. She is s/p 2nd dose MTX (). Reports scant amn. of vag. spotting.   Denies fever, chills, sob, chest pain, abd pain.     OB consult recommendation - preop labs, tvus, most likely or for d&c

## 2025-04-27 NOTE — ASU DISCHARGE PLAN (ADULT/PEDIATRIC) - ASU DC SPECIAL INSTRUCTIONSFT
Expect abdominal cramping/pain and spotting for the next weeks. Take ibuprofen and Tylenol for cramping. Use a pad as needed. Call your physician or go to the emergency room if you experience any of the following: heavy vaginal bleeding (soaking more than 2 pads in 1 hour for 2 hours), fever, chills, nausea, vomiting, or pain that is not controlled by medication. Follow-up with Dr. Tolbert in 2 weeks.

## 2025-04-27 NOTE — BRIEF OPERATIVE NOTE - NSICDXBRIEFOPLAUNCH_GEN_ALL_CORE
Injectable Influenza Immunization Documentation    1.  Is the person to be vaccinated sick today?   No    2. Does the person to be vaccinated have an allergy to a component   of the vaccine?   No  Egg Allergy Algorithm Link    3. Has the person to be vaccinated ever had a serious reaction   to influenza vaccine in the past?   No    4. Has the person to be vaccinated ever had Guillain-Barré syndrome?   No    Form completed by roula shook           <--- Click to Launch ICDx for PreOp, PostOp and Procedure

## 2025-04-28 RX ORDER — IBUPROFEN 200 MG
1 TABLET ORAL
Qty: 0 | Refills: 0 | DISCHARGE

## 2025-04-28 RX ORDER — ACETAMINOPHEN 500 MG/5ML
3 LIQUID (ML) ORAL
Qty: 0 | Refills: 0 | DISCHARGE

## 2025-04-30 ENCOUNTER — APPOINTMENT (OUTPATIENT)
Dept: OBGYN | Facility: CLINIC | Age: 30
End: 2025-04-30
Payer: COMMERCIAL

## 2025-04-30 VITALS
TEMPERATURE: 98 F | HEIGHT: 64 IN | DIASTOLIC BLOOD PRESSURE: 78 MMHG | RESPIRATION RATE: 16 BRPM | WEIGHT: 145 LBS | BODY MASS INDEX: 24.75 KG/M2 | HEART RATE: 76 BPM | SYSTOLIC BLOOD PRESSURE: 130 MMHG | OXYGEN SATURATION: 99 %

## 2025-04-30 DIAGNOSIS — Z48.89 ENCOUNTER FOR OTHER SPECIFIED SURGICAL AFTERCARE: ICD-10-CM

## 2025-04-30 PROCEDURE — 99213 OFFICE O/P EST LOW 20 MIN: CPT

## 2025-05-01 LAB — SURGICAL PATHOLOGY STUDY: SIGNIFICANT CHANGE UP

## 2025-05-16 ENCOUNTER — NON-APPOINTMENT (OUTPATIENT)
Age: 30
End: 2025-05-16

## 2025-06-04 ENCOUNTER — APPOINTMENT (OUTPATIENT)
Dept: OBGYN | Facility: CLINIC | Age: 30
End: 2025-06-04
Payer: COMMERCIAL

## 2025-06-04 VITALS
OXYGEN SATURATION: 100 % | BODY MASS INDEX: 24.75 KG/M2 | TEMPERATURE: 98.1 F | DIASTOLIC BLOOD PRESSURE: 66 MMHG | SYSTOLIC BLOOD PRESSURE: 110 MMHG | RESPIRATION RATE: 16 BRPM | HEIGHT: 64 IN | WEIGHT: 145 LBS | HEART RATE: 90 BPM

## 2025-06-04 DIAGNOSIS — Z48.89 ENCOUNTER FOR OTHER SPECIFIED SURGICAL AFTERCARE: ICD-10-CM

## 2025-06-04 PROCEDURE — 99213 OFFICE O/P EST LOW 20 MIN: CPT

## (undated) DEVICE — WARMING BLANKET FULL ADULT

## (undated) DEVICE — POSITIONER FOAM EGG CRATE ULNAR 2PCS (PINK)

## (undated) DEVICE — SMOKE EVACUTATION SYS LAPROSCOPIC AC/PA

## (undated) DEVICE — LIGASURE BLUNT TIP 5MM X 37CM

## (undated) DEVICE — DRSG DERMABOND 0.7ML

## (undated) DEVICE — FOLEY TRAY 16FR 5CC LF UMETER CLOSED

## (undated) DEVICE — D HELP - CLEARVIEW CLEARIFY SYSTEM

## (undated) DEVICE — DRAPE WARMING SOLUTION 44 X 44"

## (undated) DEVICE — TIP METZENBAUM SCISSOR MONOPOLAR ENDOCUT (ORANGE)

## (undated) DEVICE — ENDOCATCH 5MM INZII

## (undated) DEVICE — SOL IRR BAG NS 0.9% 3000ML

## (undated) DEVICE — DRSG DERMABOND MINI

## (undated) DEVICE — SOL IRR POUR H2O 500ML

## (undated) DEVICE — DRSG TEGADERM 2.5 X 3"

## (undated) DEVICE — SUT MONOCRYL 4-0 27" PS-2 UNDYED

## (undated) DEVICE — DRSG TELFA 3 X 8

## (undated) DEVICE — DRSG KERLIX ROLL LG 4.5"

## (undated) DEVICE — TROCAR COVIDIEN VERSAONE BLUNT TIP HASSAN 12MM

## (undated) DEVICE — SUT VICRYL 0 27" UR-6

## (undated) DEVICE — TUBING HYDRO-SURG PLUS IRRIGATOR W SMOKEVAC & PROBE

## (undated) DEVICE — PACK D&C

## (undated) DEVICE — PREP BETADINE KIT

## (undated) DEVICE — BLADE SURGICAL #15 CARBON

## (undated) DEVICE — TROCAR APPLIED MEDICAL KII FIOS FIRST ENTRY 5MM X 100MM ADVANCED FIXATION

## (undated) DEVICE — TUBING SUCTION NONCONDUCTIVE 6MM X 12FT

## (undated) DEVICE — DURABLE MEDICAL EQUIPMENT: Type: DURABLE MEDICAL EQUIPMENT

## (undated) DEVICE — PROTECTOR HEEL / ELBOW FLUFFY

## (undated) DEVICE — PACK GENERAL LAPAROSCOPY

## (undated) DEVICE — TROCAR COVIDIEN VERSAONE BLADED FIXATION 11MM STANDARD

## (undated) DEVICE — LIGASURE MARYLAND 5MM X 37CM

## (undated) DEVICE — POSITIONER STRAP ARMBOARD VELCRO TS-30

## (undated) DEVICE — TROCAR APPLIED MEDICAL KII BALLOON BLUNT TIP 12MM X 130MM

## (undated) DEVICE — BASIN SET SINGLE

## (undated) DEVICE — UTERINE MANIPULATOR COOPER SURGICAL 5MM 33CM GREEN

## (undated) DEVICE — TROCAR COVIDIEN VERSAPORT BLADELESS OPTICAL 5MM STANDARD

## (undated) DEVICE — TRAP SPECIMEN SPUTUM 40CC

## (undated) DEVICE — TUBING OLYMPUS INSUFFLATION

## (undated) DEVICE — DRSG STERISTRIPS 0.5 X 4"

## (undated) DEVICE — TROCAR ETHICON ENDOPATH XCEL BLADELESS 5MM X 100MM STABILITY

## (undated) DEVICE — VENODYNE/SCD SLEEVE CALF MEDIUM